# Patient Record
Sex: FEMALE | Race: BLACK OR AFRICAN AMERICAN
[De-identification: names, ages, dates, MRNs, and addresses within clinical notes are randomized per-mention and may not be internally consistent; named-entity substitution may affect disease eponyms.]

---

## 2018-01-26 ENCOUNTER — HOSPITAL ENCOUNTER (EMERGENCY)
Dept: HOSPITAL 62 - ER | Age: 83
Discharge: HOME | End: 2018-01-26
Payer: MEDICARE

## 2018-01-26 VITALS — DIASTOLIC BLOOD PRESSURE: 80 MMHG | SYSTOLIC BLOOD PRESSURE: 150 MMHG

## 2018-01-26 DIAGNOSIS — N30.00: Primary | ICD-10-CM

## 2018-01-26 DIAGNOSIS — Z98.51: ICD-10-CM

## 2018-01-26 DIAGNOSIS — I10: ICD-10-CM

## 2018-01-26 DIAGNOSIS — Z87.891: ICD-10-CM

## 2018-01-26 DIAGNOSIS — R68.83: ICD-10-CM

## 2018-01-26 LAB
ADD MANUAL DIFF: NO
ALBUMIN SERPL-MCNC: 3.9 G/DL (ref 3.5–5)
ALP SERPL-CCNC: 56 U/L (ref 38–126)
ALT SERPL-CCNC: 18 U/L (ref 9–52)
ANION GAP SERPL CALC-SCNC: 9 MMOL/L (ref 5–19)
APPEARANCE UR: (no result)
APTT PPP: YELLOW S
AST SERPL-CCNC: 37 U/L (ref 14–36)
BASOPHILS # BLD AUTO: 0 10^3/UL (ref 0–0.2)
BASOPHILS NFR BLD AUTO: 0.4 % (ref 0–2)
BILIRUB DIRECT SERPL-MCNC: 0.2 MG/DL (ref 0–0.4)
BILIRUB SERPL-MCNC: 0.6 MG/DL (ref 0.2–1.3)
BILIRUB UR QL STRIP: NEGATIVE
BUN SERPL-MCNC: 18 MG/DL (ref 7–20)
CALCIUM: 10.5 MG/DL (ref 8.4–10.2)
CHLORIDE SERPL-SCNC: 105 MMOL/L (ref 98–107)
CO2 SERPL-SCNC: 29 MMOL/L (ref 22–30)
EOSINOPHIL # BLD AUTO: 0 10^3/UL (ref 0–0.6)
EOSINOPHIL NFR BLD AUTO: 0.5 % (ref 0–6)
ERYTHROCYTE [DISTWIDTH] IN BLOOD BY AUTOMATED COUNT: 15.1 % (ref 11.5–14)
GLUCOSE SERPL-MCNC: 99 MG/DL (ref 75–110)
GLUCOSE UR STRIP-MCNC: NEGATIVE MG/DL
HCT VFR BLD CALC: 37.7 % (ref 36–47)
HGB BLD-MCNC: 11.8 G/DL (ref 12–15.5)
KETONES UR STRIP-MCNC: NEGATIVE MG/DL
LYMPHOCYTES # BLD AUTO: 1.7 10^3/UL (ref 0.5–4.7)
LYMPHOCYTES NFR BLD AUTO: 22.5 % (ref 13–45)
MCH RBC QN AUTO: 23 PG (ref 27–33.4)
MCHC RBC AUTO-ENTMCNC: 31.2 G/DL (ref 32–36)
MCV RBC AUTO: 74 FL (ref 80–97)
MONOCYTES # BLD AUTO: 0.7 10^3/UL (ref 0.1–1.4)
MONOCYTES NFR BLD AUTO: 9.3 % (ref 3–13)
NEUTROPHILS # BLD AUTO: 5 10^3/UL (ref 1.7–8.2)
NEUTS SEG NFR BLD AUTO: 67.3 % (ref 42–78)
NITRITE UR QL STRIP: POSITIVE
PH UR STRIP: 5 [PH] (ref 5–9)
PLATELET # BLD: 223 10^3/UL (ref 150–450)
POTASSIUM SERPL-SCNC: 3.8 MMOL/L (ref 3.6–5)
PROT SERPL-MCNC: 7.6 G/DL (ref 6.3–8.2)
PROT UR STRIP-MCNC: NEGATIVE MG/DL
RBC # BLD AUTO: 5.12 10^6/UL (ref 3.72–5.28)
SODIUM SERPL-SCNC: 142.6 MMOL/L (ref 137–145)
SP GR UR STRIP: 1.02
TOTAL CELLS COUNTED % (AUTO): 100 %
UROBILINOGEN UR-MCNC: 4 MG/DL (ref ?–2)
WBC # BLD AUTO: 7.5 10^3/UL (ref 4–10.5)

## 2018-01-26 PROCEDURE — 99283 EMERGENCY DEPT VISIT LOW MDM: CPT

## 2018-01-26 PROCEDURE — 80053 COMPREHEN METABOLIC PANEL: CPT

## 2018-01-26 PROCEDURE — 81001 URINALYSIS AUTO W/SCOPE: CPT

## 2018-01-26 PROCEDURE — 36415 COLL VENOUS BLD VENIPUNCTURE: CPT

## 2018-01-26 PROCEDURE — 85025 COMPLETE CBC W/AUTO DIFF WBC: CPT

## 2018-01-26 PROCEDURE — 87040 BLOOD CULTURE FOR BACTERIA: CPT

## 2018-01-26 PROCEDURE — 87086 URINE CULTURE/COLONY COUNT: CPT

## 2018-01-26 PROCEDURE — 87088 URINE BACTERIA CULTURE: CPT

## 2018-01-26 PROCEDURE — 87186 SC STD MICRODIL/AGAR DIL: CPT

## 2018-01-26 NOTE — ER DOCUMENT REPORT
ED Medical Screen (RME)





- General


Chief Complaint: Tremor


Stated Complaint: CHILLS, SHAKINESS


Time Seen by Provider: 18 17:16


Notes: 





This 82-year-old female patient brought to emergency room for onset last night 

of shaking and cold chills.  No other complaints.  At this time the patient 

states she feels fine.





I have greeted and performed a rapid initial assessment of this patient.  A 

comprehensive ED assessment and evaluation of the patient, analysis of test 

results and completion of the medical decision making process will be conducted 

by additional ED providers.


TRAVEL OUTSIDE OF THE [place] IN LAST 30 DAYS: No





- Related Data


Allergies/Adverse Reactions: 


 





No Known Allergies Allergy (Verified 18 16:57)


 











Past Medical History





- Past Medical History


Cardiac Medical History: Reports: Hx Hypertension - medicated


   Denies: Hx Atrial Fibrillation, Hx Congestive Heart Failure, Hx Coronary 

Artery Disease, Hx Heart Attack, Hx Hypercholesterolemia, Hx Peripheral 

Vascular Disease, Hx Heart Murmur


Pulmonary Medical History: 


   Denies: Hx Asthma, Hx Bronchitis, Hx COPD, Hx Pneumonia, Hx Tuberculosis


Neurological Medical History: Denies: Hx Cerebrovascular Accident, Hx Seizures


GI Medical History: Denies: Hx Hepatitis, Hx Hiatal Hernia, Hx Ulcer


Musculoskeltal Medical History: Denies Hx Multiple Sclerosis


Psychiatric Medical History: 


   Denies: Hx Dementia


Infectious Medical History: Denies: Hx Hepatitis


Past Surgical History: Reports: Hx Tubal Ligation.  Denies: Hx Appendectomy, Hx 

Bowel Surgery, Hx  Section, Hx Cholecystectomy, Hx Coronary Artery 

Bypass Graft, Hx Gastric Bypass Surgery, Hx Herniorrhaphy, Hx Hysterectomy, Hx 

Mastectomy, Hx Open Heart Surgery, Hx Pacemaker, Hx Tonsillectomy





Physical Exam





- Vital signs


Vitals: 





 











Temp Pulse BP Pulse Ox


 


 97.5 F   94   141/82 H  97 


 


 18 17:15  18 17:15  18 17:15  18 17:15














Course





- Vital Signs


Vital signs: 





 











Temp Pulse Resp BP Pulse Ox


 


 97.5 F   94      141/82 H  97 


 


 18 17:15  18 17:15     18 17:15  18 17:15

## 2018-01-26 NOTE — ER DOCUMENT REPORT
ED General





- General


Chief Complaint: Tremor


Stated Complaint: CHILLS, SHAKINESS


Time Seen by Provider: 18 17:16


Notes: 





Patient is an 82-year-old woman who presents with 48 hours of intermittent 

shaking and chills.  She denies any complaints at time of my assessment.  

Family states they have noticed her appearing very cold and shaking 

particularly at night which is what prompted them to bring her to the emergency 

department today.  No history of similar symptoms in the past.  Patient denies 

any localizing infectious symptoms including cough, headache, neck pain, 

shortness of breath, abdominal pain, vomiting or diarrhea.  She denies dysuria.

  She has not seen her primary doctor regarding today's concerns.  She has not 

tried to treat her symptoms and nothing seems to prompt the symptoms.


TRAVEL OUTSIDE OF THE [place] IN LAST 30 DAYS: No





- Related Data


Allergies/Adverse Reactions: 


 





No Known Allergies Allergy (Verified 18 16:57)


 











Past Medical History





- General


Information source: Patient





- Social History


Smoking Status: Former Smoker


Chew tobacco use (# tins/day): Yes


Frequency of alcohol use: None


Drug Abuse: None


Lives with: Family


Family History: Reviewed & Not Pertinent


Patient has suicidal ideation: No


Patient has homicidal ideation: No





- Past Medical History


Cardiac Medical History: Reports: Hx Hypertension - medicated


   Denies: Hx Atrial Fibrillation, Hx Congestive Heart Failure, Hx Coronary 

Artery Disease, Hx Heart Attack, Hx Hypercholesterolemia, Hx Peripheral 

Vascular Disease, Hx Heart Murmur


Pulmonary Medical History: 


   Denies: Hx Asthma, Hx Bronchitis, Hx COPD, Hx Pneumonia, Hx Tuberculosis


Neurological Medical History: Denies: Hx Cerebrovascular Accident, Hx Seizures


Renal/ Medical History: Denies: Hx Peritoneal Dialysis


GI Medical History: Denies: Hx Hepatitis, Hx Hiatal Hernia, Hx Ulcer


Musculoskeltal Medical History: Denies Hx Multiple Sclerosis


Psychiatric Medical History: 


   Denies: Hx Dementia


Infectious Medical History: Denies: Hx Hepatitis


Past Surgical History: Reports: Hx Tubal Ligation.  Denies: Hx Appendectomy, Hx 

Bowel Surgery, Hx  Section, Hx Cholecystectomy, Hx Coronary Artery 

Bypass Graft, Hx Gastric Bypass Surgery, Hx Herniorrhaphy, Hx Hysterectomy, Hx 

Mastectomy, Hx Open Heart Surgery, Hx Pacemaker, Hx Tonsillectomy





- Immunizations


Hx Pneumococcal Vaccination: 14





Review of Systems





- Review of Systems


Notes: 





Constitutional: Negative for fever.  Positive for chills


HENT: Negative for sore throat.


Eyes: Negative for visual changes.


Cardiovascular: Negative for chest pain.


Respiratory: Negative for shortness of breath.


Gastrointestinal: Negative for abdominal pain, vomiting or diarrhea.


Genitourinary: Negative for dysuria.


Musculoskeletal: Negative for back pain.


Skin: Negative for rash.


Neurological: Negative for headaches, weakness or numbness.





10 point ROS negative except as marked above and in HPI.





Physical Exam





- Vital signs


Vitals: 


 











Temp Pulse BP Pulse Ox


 


 97.5 F   94   141/82 H  97 


 


 18 17:15  18 17:15  18 17:15  18 17:15











Interpretation: Normal


Notes: 





PHYSICAL EXAMINATION:





GENERAL: Well-appearing, well-nourished and in no acute distress.





HEAD: Atraumatic, normocephalic.





EYES: Pupils equal round and reactive to light, extraocular movements intact, 

sclera anicteric, conjunctiva are normal.





ENT: nares patent, oropharynx clear without exudates.  Moist mucous membranes.





NECK: Normal range of motion, supple without lymphadenopathy





LUNGS: Breath sounds clear to auscultation bilaterally and equal.  No wheezes 

rales or rhonchi.





HEART: Regular rate and rhythm without murmurs





ABDOMEN: Soft, nontender, normoactive bowel sounds.  No guarding, no rebound.  

No masses appreciated.





EXTREMITIES: Normal range of motion, no pitting or edema.  No cyanosis.





NEUROLOGICAL: No focal neurological deficits. Moves all extremities 

spontaneously and on command.





PSYCH: Normal mood, normal affect.





SKIN: Warm, Dry, normal turgor, no rashes or lesions noted.





Course





- Re-evaluation


Re-evalutation: 





18 19:23


Patient presents with intermittent tremors and chills but denies any additional 

complaints.  She is otherwise very well in appearance, denies any complaints at 

the time of my assessment.  She denies any clinical history to suggest an acute 

pneumonia.  Lung sounds clear.  No abdominal pain or localized tenderness.  I 

do not suspect an acute appendicitis, aortic dissection, mesenteric ischemia, 

regional colitis, and I have not identified any areas of cellulitis.  She is 

laughing and joking with her family during the exam.  Her urinalysis is 

consistent with an acute urinary tract infection with 3+ bacteria, 15 white 

blood cells, positive leuk esterase and nitrates.  A urine culture has been 

sent and she will be started on cephalexin for coverage.  At this time will 

discharge with return precautions and follow-up recommendations.  Verbal 

discharge instructions given a the bedside and opportunity for questions given. 

Medication warnings reviewed. Patient is in agreement with this plan and has 

verbalized understanding of return precautions and the need for primary care 

follow-up in the next 24-72 hours.





- Vital Signs


Vital signs: 


 











Temp Pulse Resp BP Pulse Ox


 


 98.9 F   76   14   150/80 H  96 


 


 18 19:47  18 19:47  18 19:47  18 19:47  18 19:47














- Laboratory


Result Diagrams: 


 18 17:30





 18 17:30


Laboratory results interpreted by me: 


 











  18





  17:30 17:30 17:30


 


Hgb  11.8 L  


 


MCV  74 L  


 


MCH  23.0 L  


 


MCHC  31.2 L  


 


RDW  15.1 H  


 


Est GFR (Non-Af Amer)   56 L 


 


Calcium   10.5 H 


 


AST   37 H 


 


Urine Nitrite    POSITIVE H


 


Urine Urobilinogen    4.0 H


 


Ur Leukocyte Esterase    SMALL H














Discharge





- Discharge


Clinical Impression: 


 Chills





Urinary tract infection


Qualifiers:


 Urinary tract infection type: acute cystitis Hematuria presence: without 

hematuria Qualified Code(s): N30.00 - Acute cystitis without hematuria





Condition: Good


Disposition: HOME, SELF-CARE


Additional Instructions: 


Your urine shows findings consistent with a urinary tract infection.  Please 

take all the antibiotics as directed even if your symptoms have improved.  

Please follow-up with your primary care physician as needed.  Return to 

emergency room if you develop fever >101F, persistent vomiting, become lethargic

, have severe pain in your sides, or any other symptoms that are concerning to 

you.


Prescriptions: 


Cephalexin Monohydrate [Keflex 500 mg Capsule] 500 mg PO Q6H 5 Days  capsule


Referrals: 


KRISTEL GIBBONS MD [Primary Care Provider] - Follow up as needed

## 2019-02-09 ENCOUNTER — HOSPITAL ENCOUNTER (EMERGENCY)
Dept: HOSPITAL 62 - ER | Age: 84
Discharge: HOME | End: 2019-02-09
Payer: MEDICARE

## 2019-02-09 VITALS — SYSTOLIC BLOOD PRESSURE: 132 MMHG | DIASTOLIC BLOOD PRESSURE: 90 MMHG

## 2019-02-09 DIAGNOSIS — Z98.51: ICD-10-CM

## 2019-02-09 DIAGNOSIS — I10: ICD-10-CM

## 2019-02-09 DIAGNOSIS — F03.90: ICD-10-CM

## 2019-02-09 DIAGNOSIS — D72.820: ICD-10-CM

## 2019-02-09 DIAGNOSIS — R41.0: Primary | ICD-10-CM

## 2019-02-09 LAB
A TYPE INFLUENZA AG: NEGATIVE
ADD MANUAL DIFF: NO
ALBUMIN SERPL-MCNC: 4.5 G/DL (ref 3.5–5)
ALP SERPL-CCNC: 78 U/L (ref 38–126)
ALT SERPL-CCNC: 20 U/L (ref 9–52)
ANION GAP SERPL CALC-SCNC: 10 MMOL/L (ref 5–19)
APPEARANCE UR: CLEAR
APTT PPP: YELLOW S
AST SERPL-CCNC: 47 U/L (ref 14–36)
B INFLUENZA AG: NEGATIVE
BASOPHILS # BLD AUTO: 0 10^3/UL (ref 0–0.2)
BASOPHILS NFR BLD AUTO: 0.1 % (ref 0–2)
BILIRUB DIRECT SERPL-MCNC: 0.4 MG/DL (ref 0–0.4)
BILIRUB SERPL-MCNC: 0.8 MG/DL (ref 0.2–1.3)
BILIRUB UR QL STRIP: NEGATIVE
BUN SERPL-MCNC: 34 MG/DL (ref 7–20)
CALCIUM: 10.6 MG/DL (ref 8.4–10.2)
CHLORIDE SERPL-SCNC: 105 MMOL/L (ref 98–107)
CO2 SERPL-SCNC: 29 MMOL/L (ref 22–30)
EOSINOPHIL # BLD AUTO: 0 10^3/UL (ref 0–0.6)
EOSINOPHIL NFR BLD AUTO: 0 % (ref 0–6)
ERYTHROCYTE [DISTWIDTH] IN BLOOD BY AUTOMATED COUNT: 14.8 % (ref 11.5–14)
GLUCOSE SERPL-MCNC: 99 MG/DL (ref 75–110)
GLUCOSE UR STRIP-MCNC: NEGATIVE MG/DL
HCT VFR BLD CALC: 38.4 % (ref 36–47)
HGB BLD-MCNC: 12 G/DL (ref 12–15.5)
KETONES UR STRIP-MCNC: NEGATIVE MG/DL
LYMPHOCYTES # BLD AUTO: 1.3 10^3/UL (ref 0.5–4.7)
LYMPHOCYTES NFR BLD AUTO: 9.1 % (ref 13–45)
MCH RBC QN AUTO: 23.1 PG (ref 27–33.4)
MCHC RBC AUTO-ENTMCNC: 31.3 G/DL (ref 32–36)
MCV RBC AUTO: 74 FL (ref 80–97)
MONOCYTES # BLD AUTO: 0.9 10^3/UL (ref 0.1–1.4)
MONOCYTES NFR BLD AUTO: 6.8 % (ref 3–13)
NEUTROPHILS # BLD AUTO: 11.5 10^3/UL (ref 1.7–8.2)
NEUTS SEG NFR BLD AUTO: 84 % (ref 42–78)
NITRITE UR QL STRIP: NEGATIVE
PH UR STRIP: 5 [PH] (ref 5–9)
PLATELET # BLD: 212 10^3/UL (ref 150–450)
POTASSIUM SERPL-SCNC: 3.9 MMOL/L (ref 3.6–5)
PROT SERPL-MCNC: 8.7 G/DL (ref 6.3–8.2)
PROT UR STRIP-MCNC: NEGATIVE MG/DL
RBC # BLD AUTO: 5.21 10^6/UL (ref 3.72–5.28)
SODIUM SERPL-SCNC: 144.1 MMOL/L (ref 137–145)
SP GR UR STRIP: 1.02
TOTAL CELLS COUNTED % (AUTO): 100 %
UROBILINOGEN UR-MCNC: NEGATIVE MG/DL (ref ?–2)
WBC # BLD AUTO: 13.7 10^3/UL (ref 4–10.5)

## 2019-02-09 PROCEDURE — 87086 URINE CULTURE/COLONY COUNT: CPT

## 2019-02-09 PROCEDURE — 80053 COMPREHEN METABOLIC PANEL: CPT

## 2019-02-09 PROCEDURE — 99285 EMERGENCY DEPT VISIT HI MDM: CPT

## 2019-02-09 PROCEDURE — 87804 INFLUENZA ASSAY W/OPTIC: CPT

## 2019-02-09 PROCEDURE — 72125 CT NECK SPINE W/O DYE: CPT

## 2019-02-09 PROCEDURE — 71045 X-RAY EXAM CHEST 1 VIEW: CPT

## 2019-02-09 PROCEDURE — 87040 BLOOD CULTURE FOR BACTERIA: CPT

## 2019-02-09 PROCEDURE — 93010 ELECTROCARDIOGRAM REPORT: CPT

## 2019-02-09 PROCEDURE — 93005 ELECTROCARDIOGRAM TRACING: CPT

## 2019-02-09 PROCEDURE — 36415 COLL VENOUS BLD VENIPUNCTURE: CPT

## 2019-02-09 PROCEDURE — 84484 ASSAY OF TROPONIN QUANT: CPT

## 2019-02-09 PROCEDURE — 85025 COMPLETE CBC W/AUTO DIFF WBC: CPT

## 2019-02-09 PROCEDURE — 70450 CT HEAD/BRAIN W/O DYE: CPT

## 2019-02-09 PROCEDURE — 81001 URINALYSIS AUTO W/SCOPE: CPT

## 2019-02-09 NOTE — RADIOLOGY REPORT (SQ)
EXAM DESCRIPTION:  CHEST SINGLE VIEW



COMPLETED DATE/TIME:  2/9/2019 2:40 pm



REASON FOR STUDY:  AMS



COMPARISON:  1/9/2009.  2/3/2014.



EXAM PARAMETERS:  NUMBER OF VIEWS: One view.

TECHNIQUE: Single frontal radiographic view of the chest acquired.

RADIATION DOSE: NA

LIMITATIONS: None.



FINDINGS:  LUNGS AND PLEURA: Chronic lingular scarring .  No acute infiltrates or effusions.

MEDIASTINUM AND HILAR STRUCTURES: No masses.  Contour normal.

HEART AND VASCULAR STRUCTURES: The heart is normal with aortic atherosclerosis.  Pulmonary vasculatur
e is normal.

BONES: No acute findings.

HARDWARE: None in the chest.

OTHER: Persistent tracheal deviation to the left secondary to right thyroid enlargement.



IMPRESSION:  No acute disease.  See above.



TECHNICAL DOCUMENTATION:  JOB ID:  [id number]

[id number]

 2011 [email or web address]- All Rights Reserved



Reading location - IP/workstation name: [email or web address]

## 2019-02-09 NOTE — ER DOCUMENT REPORT
ED Medical Screen (RME)





- General


Chief Complaint: Urinary Problem


Stated Complaint: URINARY ISSUE


Time Seen by Provider: 19 14:14


Primary Care Provider: 


KRISTEL GIBBONS MD [Primary Care Provider] - Follow up as needed


Mode of Arrival: Wheelchair


Information source: Patient, Relative


Notes: 





83-year-old female brought to the emergency department by her daughter for 

altered mental status.  Daughter states that the patient has a history of 

dementia but is not at her baseline.  She states that she is more confused than 

normal today she has been rocking back and forth.  Daughter states that her 

symptoms are similar to when she has had a urinary tract infection previously.  

Daughter does note that she had 4 teeth pulled yesterday.  She is currently on 

clindamycin.  Daughter also notes that she found her on the ground yesterday.  

Unknown if the patient hit her head or loss consciousness.  Patient was 

complaining of neck pain yesterday.  Patient has no complaints in the room.  

Daughter denies any fever, vomiting, diarrhea, constipation.  States that she 

did have one episode of incontinence last night.





I have greeted and performed a rapid initial assessment of this patient.  A 

comprehensive ED assessment and evaluation of the patient, analysis of test 

results and completion of the medical decision making process will be conducted 

by additional ED providers.





PHYSICAL EXAMINATION:





GENERAL: Well-appearing, well-nourished and in no acute distress.





HEAD: No obvious trauma. 





EYES: Pupils equal round extraocular movements intact,  conjunctiva are normal.





ENT: Nares patent





NECK: Normal range of motion. No tenderness to palpation. 





LUNGS: No respiratory distress





Musculoskeletal: Normal range of motion





NEUROLOGICAL:  Normal speech.





TRAVEL OUTSIDE OF THE [place] IN LAST 30 DAYS: No





- Related Data


Allergies/Adverse Reactions: 


                                        





No Known Allergies Allergy (Verified 19 14:12)


   











Past Medical History





- Social History


Chew tobacco use (# tins/day): Yes


Frequency of alcohol use: None


Drug Abuse: None





- Past Medical History


Cardiac Medical History: Reports: Hx Hypertension - medicated


   Denies: Hx Atrial Fibrillation, Hx Congestive Heart Failure, Hx Coronary 

Artery Disease, Hx Heart Attack, Hx Hypercholesterolemia, Hx Peripheral Vascular

Disease, Hx Heart Murmur


Pulmonary Medical History: 


   Denies: Hx Asthma, Hx Bronchitis, Hx COPD, Hx Pneumonia, Hx Tuberculosis


Neurological Medical History: Denies: Hx Cerebrovascular Accident, Hx Seizures


Renal/ Medical History: Denies: Hx Peritoneal Dialysis


GI Medical History: Denies: Hx Hepatitis, Hx Hiatal Hernia, Hx Ulcer


Musculoskeltal Medical History: Denies Hx Multiple Sclerosis


Psychiatric Medical History: 


   Denies: Hx Dementia


Infectious Medical History: Denies: Hx Hepatitis


Past Surgical History: Reports: Hx Tubal Ligation.  Denies: Hx Appendectomy, Hx 

Bowel Surgery, Hx  Section, Hx Cholecystectomy, Hx Coronary Artery 

Bypass Graft, Hx Gastric Bypass Surgery, Hx Herniorrhaphy, Hx Hysterectomy, Hx 

Mastectomy, Hx Open Heart Surgery, Hx Pacemaker, Hx Tonsillectomy





Physical Exam





- Vital signs


Vitals: 





                                        











Temp Pulse Resp BP Pulse Ox


 


 97.7 F   77   20   126/80 H  99 


 


 19 13:41  19 13:41  19 13:41  19 13:41  19 13:41














Course





- Vital Signs


Vital signs: 





                                        











Temp Pulse Resp BP Pulse Ox


 


 97.7 F   77   20   126/80 H  99 


 


 19 13:41  19 13:41  19 13:41  19 13:41  19 13:41














Doctor's Discharge





- Discharge


Referrals: 


KRISTEL GIBBONS MD [Primary Care Provider] - Follow up as needed

## 2019-02-09 NOTE — RADIOLOGY REPORT (SQ)
EXAM DESCRIPTION:  CT CERVICAL SPINE WITHOUT



COMPLETED DATE/TIME:  2/9/2019 2:48 pm



REASON FOR STUDY:  fall



COMPARISON:  None.



TECHNIQUE:  Axial images acquired through the cervical spine without intravenous contrast.  Images re
viewed with lung, soft tissue and bone windows.  Reconstructed coronal and sagittal MPR images review
ed.  Images stored on PACS.

All CT scanners at this facility use dose modulation, iterative reconstruction, and/or weight based d
osing when appropriate to reduce radiation dose to as low as reasonably achievable (ALARA).

[facility]: Dose Right  [facility]: CareDose    [facility]: Dose Right    [facility]: Teradose 4D    [facility]: Smart One Diary



RADIATION DOSE:  CT Rad equipment meets quality standard of care and radiation dose reduction techniq
ues were employed. CTDIvol: 24.1 mGy. DLP: 514 mGy-cm. mGy.



LIMITATIONS:  None.



FINDINGS:  ALIGNMENT: Scoliosis convex right

MINERALIZATION: Normal.

VERTEBRAL BODIES: Cervical spondylosis and degenerative disc disease prominent C4-7.  DISCS:

C1-C2: No abnormality.  .

C2-C3: No abnormality.

C3-C4: No abnormality.

C4-C5:  Degenerated circumferential bulging disc.  Mild foraminal narrowing noted bilaterally.

C5-C6: Degenerated circumferential bulging disc.  Foraminal stenosis prominent on the left.  Facet ar
thropathy.

C6-C7: Cervical spondylosis and degenerative disc disease.

C7-T1: No significant spinal stenosis or exit foraminal stenosis.

FACETS, LATERAL MASSES, POSTERIOR ELEMENTS: No fractures.  No dislocation.  No acute findings.

VISUALIZED RIBS: No fractures.

LUNG APICES AND SOFT TISSUES: No abnormality seen.

OTHER: The right thyroid gland is markedly enlarged and heterogeneous in density.  The right thyroid 
gland measures 8.4 cm(H)x 4.6 cm(T)X 5.9 cm (AP) diameter.  Left thyroid gland is small in size and h
eterogeneous measuring 2.7 cm (H) x1.6 cm x(T)x 1.9 cm (AP) diameter.  Substernal extension of right 
thyroid gland noted.  Associated tracheal displacement to the left is noted.



IMPRESSION:  No acute fracture identified.  Multilevel cervical spondylosis with degenerative disc di
sease.  Marked right thyromegaly.  Substernal extension of right thyroid gland and associated trachea
l deviation to the left.



TECHNICAL DOCUMENTATION:  JOB ID:  [id number]

[id number]

Quality ID # 436: Final reports with documentation of one or more dose reduction techniques (e.g., Au
tomated exposure control, adjustment of the mA and/or kV according to patient size, use of iterative 
reconstruction technique)

 2011 [employer]- All Rights Reserved



Reading location - IP/workstation name: [email or web address]

## 2019-02-09 NOTE — EKG REPORT
SEVERITY:- ABNORMAL ECG -

SINUS RHYTHM

LVH WITH SECONDARY REPOLARIZATION ABNORMALITY

:

Confirmed by: Lopez Melgar 09-Feb-2019 20:04:36

## 2019-02-09 NOTE — RADIOLOGY REPORT (SQ)
EXAM DESCRIPTION:  CT HEAD WITHOUT



COMPLETED DATE/TIME:  2/9/2019 2:48 pm



REASON FOR STUDY:  fall, AMS



COMPARISON:  None.



TECHNIQUE:  Axial images acquired through the brain without intravenous contrast.  Images reviewed wi
th bone, brain and subdural windows.  Additional sagittal and coronal reconstructions were generated.
 Images stored on PACS.

All CT scanners at this facility use dose modulation, iterative reconstruction, and/or weight based d
osing when appropriate to reduce radiation dose to as low as reasonably achievable (ALARA).

[facility]: Dose Right  [facility]: CareDose    [facility]: Dose Right    [facility]: Teradose 4D    [facility]: Smart Mustbin



RADIATION DOSE:  CT Rad equipment meets quality standard of care and radiation dose reduction techniq
ues were employed. CTDIvol: 53.2 mGy. DLP: 1097 mGy-cm.mGy.



LIMITATIONS:  None.



FINDINGS:  VENTRICLES: Generalized ventriculomegaly likely related to chronic atrophy.

CEREBRUM: No masses.  No hemorrhage.  No midline shift.  Moderate patchy areas of low density in the 
white matter most likely due to chronic micro-vascular ischemic change.  No evidence for acute infarc
tion.

CEREBELLUM: No masses.  No hemorrhage.  No alteration of density.  No evidence for acute infarction.

EXTRAAXIAL SPACES: No hemorrhage or mass or shift.

ORBITS AND GLOBE: No intra- or extraconal masses.  Normal contour of globe without masses.

CALVARIUM: No fracture.

PARANASAL SINUSES: No fluid or mucosal thickening.

SOFT TISSUES: No mass or hematoma.

OTHER: No other significant finding.



IMPRESSION:  CHRONIC CHANGES OF ATROPHY AND MICROVASCULAR ISCHEMIA.  NO ACUTE PROCESS.

EVIDENCE OF ACUTE STROKE: NO.



TECHNICAL DOCUMENTATION:  JOB ID:  [id number]

Quality ID # 436: Final reports with documentation of one or more dose reduction techniques (e.g., Au
tomated exposure control, adjustment of the mA and/or kV according to patient size, use of iterative 
reconstruction technique)

 2011 [email or web address]- All Rights Reserved



Reading location - IP/workstation name: [email or web address]

## 2019-02-27 NOTE — ER DOCUMENT REPORT
Entered by LUZ MARINA ROCHE SCRIBE  02/09/19 [id number] 





Acting as scribe for:SHANIQUE UMAÑA DO





ED General





- General


Chief Complaint: Urinary Problem


Stated Complaint: URINARY ISSUE


Time Seen by Provider: 02/09/19 14:14


Primary Care Provider: 


KRISTEL GIBBONS MD [Primary Care Provider] - Follow up as needed


Mode of Arrival: Wheelchair


Information source: Patient, Relative


Notes: 


Patient is an 83 year old female with dementia presents to the emergency 

department accompanied by daughter complaining of altered mental status. 

Daughter states the patient has been more confused lately as well as rocking 

back and forth which is out of character. She also states she found the patient 

this morning sitting on the floor. She is unsure if the patient fell or had a 

loss of consciousness. Daughter states she believes the patient has an UTI due 

to having similar symptoms with a previous UTI. She also reports an episode of 

urine incontinence. Daughter denies fevers, vomiting, diarrhea or constipation. 

Patient denies any pain or focal symptoms. 





TRAVEL OUTSIDE OF THE [place] IN LAST 30 DAYS: No





- Related Data


Allergies/Adverse Reactions: 


                                        





No Known Allergies Allergy (Verified 02/09/19 14:12)


   











Past Medical History





- General


Information source: Patient, Relative





- Social History


Smoking Status: Never Smoker


Chew tobacco use (# tins/day): Yes


Frequency of alcohol use: None


Drug Abuse: None


Family History: Reviewed & Not Pertinent


Patient has suicidal ideation: No


Patient has homicidal ideation: No





- Past Medical History


Cardiac Medical History: Reports: Hx Hypertension - medicated


Psychiatric Medical History: Reports: Hx Dementia


Past Surgical History: Reports: Hx Tubal Ligation





- Immunizations


Hx Pneumococcal Vaccination: 02/13/14





Review of Systems





- Review of Systems


Constitutional: No symptoms reported


EENT: No symptoms reported


Cardiovascular: No symptoms reported


Respiratory: No symptoms reported


Gastrointestinal: No symptoms reported


Genitourinary: No symptoms reported


Female Genitourinary: No symptoms reported


Musculoskeletal: No symptoms reported


Skin: No symptoms reported


Hematologic/Lymphatic: No symptoms reported


Neurological/Psychological: See HPI, Confusion, Dementia


-: Yes All other systems reviewed and negative





Physical Exam





- Vital signs


Vitals: 


                                        











Temp Pulse Resp BP Pulse Ox


 


 97.7 F   77   20   126/80 H  99 


 


 02/09/19 13:41  02/09/19 13:41  02/09/19 13:41  02/09/19 13:41  02/09/19 13:41














- Notes


Notes: 


GENERAL: Alert, interacts well. No acute distress.


HEAD: Normocephalic, atraumatic.


EYES: Pupils equal, round, and reactive to light. Extraocular movements intact.


ENT: Oral mucosa moist, tongue midline. 


NECK: Full range of motion. Supple. Trachea midline.


LUNGS: Clear to auscultation bilaterally, no wheezes, rales, or rhonchi. No 

respiratory distress.


HEART: Regular rate and rhythm.  1/6 systolic murmur.  No gallops or rubs.


ABDOMEN: Soft, non-tender. Non-distended. Bowel sounds present in all 4 

quadrants.


EXTREMITIES: Moves all 4 extremities spontaneously. 


NEUROLOGICAL: Alert and oriented to person and place. Not oriented to time. 

Normal speech. 


PSYCH: Normal affect, normal mood.


SKIN: Warm, dry, normal turgor. No rashes or lesions noted.











Course





- Re-evaluation


Re-evalutation: 





02/09/19 19:31


CBC shows leukocytosis of 13.7, no anemia, platelets normal, CMP shows slightly 

elevated BUN otherwise unremarkable, cardiac enzymes negative x2, urinalysis 

unremarkable, flu a and B swabs are negative, CT scan of the head and neck were 

ordered given the fact that she was found on her floor and we do not know how 

she ended up there.  These were negative for fracture dislocation.  Chest x-ray 

was unremarkable as well.  Only findings were tracheal deviation from stable 

thyromegaly.  Daughter does mention that the patient is currently on antibiotics

for dental infection, it is possible that the dental infection is causing her 

slightly increased confusion and the rocking back and forth.  Patient is not 

having symptoms severe enough to warrant admission at this time.  Patient will 

continue taking the oral antibiotics for her dental infection and be discharged 

to home.  Blood cultures are pending as is urine culture.  Patient will return 

to the emergency department for worsening confusion, fevers or any new or con

cerning symptoms.





- Vital Signs


Vital signs: 


                                        











Temp Pulse Resp BP Pulse Ox


 


 97.7 F   70   17   132/90 H  98 


 


 02/09/19 20:01  02/09/19 20:01  02/09/19 20:01  02/09/19 20:01  02/09/19 20:01














- Laboratory


Result Diagrams: 


                                 02/09/19 15:08





                                 02/09/19 15:08


Laboratory results interpreted by me: 


                                        











  02/09/19 02/09/19 02/09/19





  15:08 15:08 15:55


 


WBC  13.7 H  


 


MCV  74 L  


 


MCH  23.1 L  


 


MCHC  31.3 L  


 


RDW  14.8 H  


 


Seg Neutrophils %  84.0 H  


 


Lymphocytes %  9.1 L  


 


Absolute Neutrophils  11.5 H  


 


BUN   34 H 


 


Est GFR (Non-Af Amer)   50 L 


 


Calcium   10.6 H 


 


AST   47 H 


 


Total Protein   8.7 H 


 


Urine Ascorbic Acid    40 H














- EKG Interpretation by Me


Additional EKG results interpreted by me: 





02/09/19 19:33


EKG shows sinus rhythm at a rate of 71, normal axis, normal intervals, no ST 

segment elevations or depressions, there are T wave inversions noted in 1 and 

aVL as well as in V4 through the 6 which are new since prior EKG on 2/3/2014, 

there is LVH per my interpretation.





Discharge





- Discharge


Clinical Impression: 


Altered mental status


Qualifiers:


 Altered mental status type: disorientation Qualified Code(s): R41.0 - 

Disorientation, unspecified





Leukocytosis


Qualifiers:


 Leukocytosis type: lymphocytosis Qualified Code(s): D72.820 - Lymphocytosis 

(symptomatic)





Condition: Stable


Disposition: HOME, SELF-CARE


Additional Instructions: 


Please continue taking the antibiotics that you are prescribed by your dentist. 

Please return to the emergency department for fevers or worsening confusion or 

any new or concerning symptoms.


Referrals: 


KRISTEL GIBBONS MD [Primary Care Provider] - Follow up as needed





I personally performed the services described in the documentation, reviewed and

edited the documentation which was dictated to the scribe in my presence, and it

accurately records my words and actions.

## 2019-03-08 ENCOUNTER — HOSPITAL ENCOUNTER (INPATIENT)
Dept: HOSPITAL 62 - 5 | Age: 84
LOS: 4 days | Discharge: HOME | DRG: 310 | End: 2019-03-12
Attending: INTERNAL MEDICINE | Admitting: INTERNAL MEDICINE
Payer: MEDICARE

## 2019-03-08 DIAGNOSIS — K21.9: ICD-10-CM

## 2019-03-08 DIAGNOSIS — E55.9: ICD-10-CM

## 2019-03-08 DIAGNOSIS — M15.3: ICD-10-CM

## 2019-03-08 DIAGNOSIS — L30.9: ICD-10-CM

## 2019-03-08 DIAGNOSIS — Z79.899: ICD-10-CM

## 2019-03-08 DIAGNOSIS — F03.90: ICD-10-CM

## 2019-03-08 DIAGNOSIS — Z23: ICD-10-CM

## 2019-03-08 DIAGNOSIS — Z86.010: ICD-10-CM

## 2019-03-08 DIAGNOSIS — I48.91: Primary | ICD-10-CM

## 2019-03-08 DIAGNOSIS — E05.90: ICD-10-CM

## 2019-03-08 DIAGNOSIS — Z88.8: ICD-10-CM

## 2019-03-08 DIAGNOSIS — M81.0: ICD-10-CM

## 2019-03-08 DIAGNOSIS — I10: ICD-10-CM

## 2019-03-08 LAB
ADD MANUAL DIFF: NO
ALBUMIN SERPL-MCNC: 3.7 G/DL (ref 3.5–5)
ALP SERPL-CCNC: 68 U/L (ref 38–126)
ALT SERPL-CCNC: 26 U/L (ref 9–52)
ANION GAP SERPL CALC-SCNC: 8 MMOL/L (ref 5–19)
AST SERPL-CCNC: 48 U/L (ref 14–36)
BASOPHILS # BLD AUTO: 0 10^3/UL (ref 0–0.2)
BASOPHILS NFR BLD AUTO: 0.6 % (ref 0–2)
BILIRUB DIRECT SERPL-MCNC: 0.3 MG/DL (ref 0–0.4)
BILIRUB SERPL-MCNC: 0.7 MG/DL (ref 0.2–1.3)
BUN SERPL-MCNC: 33 MG/DL (ref 7–20)
CALCIUM: 10.4 MG/DL (ref 8.4–10.2)
CHLORIDE SERPL-SCNC: 104 MMOL/L (ref 98–107)
CO2 SERPL-SCNC: 29 MMOL/L (ref 22–30)
EOSINOPHIL # BLD AUTO: 0.1 10^3/UL (ref 0–0.6)
EOSINOPHIL NFR BLD AUTO: 1.4 % (ref 0–6)
ERYTHROCYTE [DISTWIDTH] IN BLOOD BY AUTOMATED COUNT: 14.3 % (ref 11.5–14)
FREE T4 (FREE THYROXINE): 1.99 NG/DL (ref 0.78–2.19)
GLUCOSE SERPL-MCNC: 109 MG/DL (ref 75–110)
HCT VFR BLD CALC: 34.7 % (ref 36–47)
HGB BLD-MCNC: 11 G/DL (ref 12–15.5)
LYMPHOCYTES # BLD AUTO: 1.1 10^3/UL (ref 0.5–4.7)
LYMPHOCYTES NFR BLD AUTO: 22.1 % (ref 13–45)
MCH RBC QN AUTO: 23.2 PG (ref 27–33.4)
MCHC RBC AUTO-ENTMCNC: 31.7 G/DL (ref 32–36)
MCV RBC AUTO: 73 FL (ref 80–97)
MONOCYTES # BLD AUTO: 0.6 10^3/UL (ref 0.1–1.4)
MONOCYTES NFR BLD AUTO: 13.1 % (ref 3–13)
NEUTROPHILS # BLD AUTO: 3 10^3/UL (ref 1.7–8.2)
NEUTS SEG NFR BLD AUTO: 62.8 % (ref 42–78)
PLATELET # BLD: 168 10^3/UL (ref 150–450)
POTASSIUM SERPL-SCNC: 3.5 MMOL/L (ref 3.6–5)
PROT SERPL-MCNC: 7.2 G/DL (ref 6.3–8.2)
RBC # BLD AUTO: 4.73 10^6/UL (ref 3.72–5.28)
SODIUM SERPL-SCNC: 140.8 MMOL/L (ref 137–145)
TOTAL CELLS COUNTED % (AUTO): 100 %
TSH SERPL-ACNC: < 0.01 UIU/ML (ref 0.47–4.68)
WBC # BLD AUTO: 4.8 10^3/UL (ref 4–10.5)

## 2019-03-08 PROCEDURE — 93010 ELECTROCARDIOGRAM REPORT: CPT

## 2019-03-08 PROCEDURE — 90471 IMMUNIZATION ADMIN: CPT

## 2019-03-08 PROCEDURE — 90686 IIV4 VACC NO PRSV 0.5 ML IM: CPT

## 2019-03-08 PROCEDURE — 80053 COMPREHEN METABOLIC PANEL: CPT

## 2019-03-08 PROCEDURE — 84443 ASSAY THYROID STIM HORMONE: CPT

## 2019-03-08 PROCEDURE — 71046 X-RAY EXAM CHEST 2 VIEWS: CPT

## 2019-03-08 PROCEDURE — G0008 ADMIN INFLUENZA VIRUS VAC: HCPCS

## 2019-03-08 PROCEDURE — 84439 ASSAY OF FREE THYROXINE: CPT

## 2019-03-08 PROCEDURE — 80048 BASIC METABOLIC PNL TOTAL CA: CPT

## 2019-03-08 PROCEDURE — 36415 COLL VENOUS BLD VENIPUNCTURE: CPT

## 2019-03-08 PROCEDURE — 85025 COMPLETE CBC W/AUTO DIFF WBC: CPT

## 2019-03-08 PROCEDURE — 93005 ELECTROCARDIOGRAM TRACING: CPT

## 2019-03-08 RX ADMIN — DILTIAZEM HYDROCHLORIDE SCH MG: 30 TABLET, FILM COATED ORAL at 23:11

## 2019-03-08 RX ADMIN — CALCIUM SCH MG: 500 TABLET ORAL at 21:16

## 2019-03-08 RX ADMIN — DILTIAZEM HYDROCHLORIDE SCH MG: 30 TABLET, FILM COATED ORAL at 15:03

## 2019-03-08 RX ADMIN — APIXABAN SCH MG: 2.5 TABLET, FILM COATED ORAL at 17:20

## 2019-03-08 NOTE — PDOC H&P
History of Present Illness


Admission Date/PCP: 


  19 13:28





  [facility]





Patient complains of: Leg swelling


History of Present Illness: 


BRUNA CORBETT is a 83 year old female known to my practice who presented to 

the office with daughter complaining about intermittent leg swelling over 

preceding 1 week. She denied any associated chest pain, difficulty with 

breathing, palpitation, irregular heart beat or excessive salt intake. She 

claimed compliance with her medication administration but did not take 

medication so far today. Daughter reported that leg swelling is more in the left

leg. No recent long distance traveling, instrumentation or trauma. Her 

evaluation in the office revealed intermittent irregularly irregular heart 

rhythm with systolic murmur but no significant leg swelling. Her 12 lead EKG 

revealed atrial fibrillation with rapid ventricular rate. She was subsequently 

advised hospitalization for further evaluation and management. She adamantly 

continue to snuff tobacco product. Her morbidities include hypertension, 

hyperthyroidism, GERD, vitamin D deficiency, osteoporosis, osteoarthritis, 

history of polyp of colon and Eczema.








Past Medical History


Cardiac Medical History: Reports: Hypertension - medicated


   Denies: Atrial Fibrillation, Congestive Heart Failure, Coronary Artery 

Disease, Myocardial Infarction, Hyperlipidema, Peripheral Vascular Disease, 

Heart Murmur


Pulmonary Medical History: 


   Denies: Asthma, Bronchitis, Chronic Obstructive Pulmonary Disease (COPD), 

Pneumonia, Tuberculosis


Neurological Medical History: 


   Denies: Seizures


GI Medical History: 


   Denies: Hepatitis, Hiatal Hernia


Psychiatric Medical History: Reports: Dementia


Hematology: 


   Denies: Anemia, Sickle Cell Disease





Past Surgical History


Past Surgical History: Reports: Tubal Ligation


   Denies: Amputation, Appendectomy,  Section, Cholecystectomy, Coronary

Artery Bypass Graft, Gastric Bypass Surgery, Herniorrhaphy, Hysterectomy, 

Mastectomy, Pacemaker, Tonsillectomy





Social History


Smoking Status: Never Smoker


Frequency of Alcohol Use: None


Hx Recreational Drug Use: No


Drugs: None


Hx Prescription Drug Abuse: No





Family History


Family History: Reviewed & Not Pertinent


Parental Family History Reviewed: Yes


Children Family History Reviewed: Yes


Sibling(s) Family History Reviewed.: Yes





Medication/Allergy


Home Medications: 








Omeprazole 20 mg PO DAILY 14 


Amlodipine Besylate [Norvasc 10 mg Tablet] 10 mg PO DAILY 19 


Calcium Carbonate [Calcium] 600 mg PO DAILY 19 


Cholecalciferol (Vitamin D3) [Vitamin D3 1000 Unit Tablet] 1,000 unit PO DAILY 

19 


Clotrimazole/Betamethasone Dip [Lotrisone Cream] 1 applic TP BID 19 


Irbesartan/Hydrochlorothiazide [Irbesartan-Hctz 300-12.5 mg Tb] 1 each PO DAILY 

19 


Metoprolol Succinate [Toprol Xl 50 mg Tab.sr] 50 mg PO DAILY 19 


Multivitamin [Tab-A-Jese (Multiple Vitamin) Tablet] 1 tab PO DAILY 19 


Nystatin [Mycostatin Ointment] 1 applic TP BID 19 








Allergies/Adverse Reactions: 


                                        





lisinopril Adverse Reaction (Mild, Verified 19 17:36)


   cough











Review of Systems


Constitutional: ABSENT: chills, fever(s), headache(s), weight gain, weight loss


Eyes: ABSENT: visual disturbances


Ears: ABSENT: hearing changes


Nose, Mouth, and Throat: ABSENT: as per HPI, headache(s), mouth pain, sore 

throat, vertigo, other


Cardiovascular: PRESENT: edema - intermittent over preceding 1 week.  ABSENT: 

chest pain, dyspnea on exertion, orthropnea, palpitations


Respiratory: ABSENT: cough, hemoptysis


Gastrointestinal: ABSENT: abdominal pain, constipation, diarrhea, hematemesis, 

hematochezia, nausea, vomiting


Genitourinary: ABSENT: dysuria, hematuria


Musculoskeletal: ABSENT: joint swelling


Neurological: ABSENT: abnormal gait, abnormal speech, confusion, dizziness, foc

al weakness, syncope


Endocrine: ABSENT: cold intolerance, heat intolerance, polydipsia, polyuria


Hematologic/Lymphatic: ABSENT: easy bleeding, easy bruising, lymphadenopathy


Allergic/Immunologic: ABSENT: seasonal rhinorrhea





Physical Exam


Vital Signs: 


                                        











Temp Pulse Resp BP Pulse Ox


 


 98.1 F   122 H  20   108/82   98 


 


 19 14:33  19 14:33  19 14:33  19 14:33  19 14:33








                                 Intake & Output











 19





 06:59 06:59 06:59


 


Intake Total   236


 


Balance   236


 


Weight   58.7 kg











General appearance: PRESENT: no acute distress


Head exam: PRESENT: atraumatic, normocephalic


Eye exam: PRESENT: conjunctiva pink, EOMI, PERRLA.  ABSENT: scleral icterus


Ear exam: PRESENT: normal external ear exam


Mouth exam: PRESENT: moist


Neck exam: PRESENT: full ROM.  ABSENT: carotid bruit, JVD, lymphadenopathy, 

thyromegaly


Respiratory exam: PRESENT: clear to auscultation giacomo


Cardiovascular exam: PRESENT: irregular rhythm, +S1, +S2, systolic murmur, 

tachycardia.  ABSENT: diastolic murmur


Vascular exam: PRESENT: normal capillary refill.  ABSENT: pallor


GI/Abdominal exam: PRESENT: normal bowel sounds, soft.  ABSENT: distended, 

guarding, mass, organolmegaly, rebound, tenderness


Rectal exam: PRESENT: deferred


Extremities exam: ABSENT: pedal edema


Musculoskeletal exam: PRESENT: deformity - multiple joints involvement with 

arthritis


Neurological exam: PRESENT: alert, awake, oriented to person, oriented to place,

oriented to time, oriented to situation, CN II-XII grossly intact.  ABSENT: 

motor sensory deficit


Psychiatric exam: PRESENT: appropriate affect, normal mood.  ABSENT: homicidal 

ideation, suicidal ideation


Skin exam: PRESENT: dry, rash - scaly irregular border rash over left side of 

neck, warm





Results


Laboratory Results: 


                                        





                                 19 14:30 





                                 19 14:30 





                                        











  19





  14:30 14:30 14:30


 


WBC  4.8  


 


RBC  4.73  


 


Hgb  11.0 L  


 


Hct  34.7 L  


 


MCV  73 L  


 


MCH  23.2 L  


 


MCHC  31.7 L  


 


RDW  14.3 H  


 


Plt Count  168  


 


Seg Neutrophils %  62.8  


 


Lymphocytes %  22.1  


 


Monocytes %  13.1 H  


 


Eosinophils %  1.4  


 


Basophils %  0.6  


 


Absolute Neutrophils  3.0  


 


Absolute Lymphocytes  1.1  


 


Absolute Monocytes  0.6  


 


Absolute Eosinophils  0.1  


 


Absolute Basophils  0.0  


 


Sodium   140.8 


 


Potassium   3.5 L 


 


Chloride   104 


 


Carbon Dioxide   29 


 


Anion Gap   8 


 


BUN   33 H 


 


Creatinine   1.02 


 


Est GFR ( Amer)   > 60 


 


Est GFR (Non-Af Amer)   52 L 


 


Glucose   109 


 


Calcium   10.4 H 


 


Total Bilirubin   0.7 


 


AST   48 H 


 


ALT   26 


 


Alkaline Phosphatase   68 


 


Total Protein   7.2 


 


Albumin   3.7 


 


TSH    < 0.01 L


 


Free T4    1.99











Impressions: 


                                        





Chest X-Ray  19 00:00


IMPRESSION:  NO ACUTE RADIOGRAPHIC FINDING IN THE CHEST.


 














Assessment & Plan





- Diagnosis


(1) Atrial fibrillation with rapid ventricular response


Is this a current diagnosis for this admission?: Yes   


Plan: 


Admit for rate control management and anticoagulation. See admitting physician 

orders for details.








(2) HTN (hypertension)


Qualifiers: 


   Hypertension type: essential hypertension   Qualified Code(s): I10 - 

Essential (primary) hypertension   


Is this a current diagnosis for this admission?: Yes   


Plan: 


Hold preadmission anti hypertensive medication to allow for rate control 

medication usage due to her low normal blood pressure readings presently.








(3) Hyperthyroidism, subclinical


Is this a current diagnosis for this admission?: Yes   


Plan: 


Continue to monitor response to current medication management








(4) GERD (gastroesophageal reflux disease)


Qualifiers: 


   Esophagitis presence: without esophagitis   Qualified Code(s): K21.9 - 

Gastro-esophageal reflux disease without esophagitis   


Is this a current diagnosis for this admission?: Yes   


Plan: 


Maintain on PPI medication and lifestyle management.








(5) Postmenopausal osteoporosis


Is this a current diagnosis for this admission?: Yes   


Plan: 


Continue preadmission medication management as indicated.








(6) Vitamin D deficiency


Is this a current diagnosis for this admission?: Yes   


Plan: 


Continue current preadmission medication management as indicated.








(7) Osteoarthritis involving multiple joints on both sides of body


Is this a current diagnosis for this admission?: Yes   


Plan: 


Continue current preadmission medication management as indicated.








- Time


Time Spent: 50 to 70 Minutes


Medications reviewed and adjusted accordingly: Yes


Anticipated discharge: Home


Within: Other





- Inpatient Certification


Based on my medical assessment, after consideration of the patient's 

comorbidities, presenting symptoms, or acuity I expect that the services needed 

warrant INPATIENT care.: Yes


I certify that my determination is in accordance with my understanding of 

Medicare's requirements for reasonable and necessary INPATIENT services [42 CFR 

412.3e].: Yes


Medical Necessity: Significant Comorbidiites Make Outpatient Treatment Too 

Risky, Need Close Monitoring Due to Risk of Patient Decompensation, Need For 

Continuous Telemetry Monitoring, Risk of Complication if Not Cared For in 

Hospital, Risk of Diagnosis Which Will Require Inpatient Eval/Care/Monitoring


Post Hospital Care: D/C Planner Documentation





- Plan Summary


Plan Summary: 





See admitting attending physician orders as per above outlined care plan.

## 2019-03-08 NOTE — EKG REPORT
SEVERITY:- ABNORMAL ECG -

ATRIAL FIBRILLATION, V-RATE 

LVH WITH SECONDARY REPOLARIZATION ABNORMALITY

BORDERLINE PROLONGED QT INTERVAL

:

Confirmed by: Kayla Atkins MD 08-Mar-2019 20:50:59

## 2019-03-08 NOTE — RADIOLOGY REPORT (SQ)
EXAM DESCRIPTION:  CHEST 2 VIEWS



COMPLETED DATE/TIME:  3/8/2019 3:25 pm



REASON FOR STUDY:  AFIB WITH RVR



COMPARISON:  Chest film 2/9/2019

CT chest 5/29/2014



EXAM PARAMETERS:  NUMBER OF VIEWS: two views

TECHNIQUE: Digital Frontal and Lateral radiographic views of the chest acquired.

RADIATION DOSE: NA

LIMITATIONS: none



FINDINGS:  LUNGS AND PLEURA: No opacities, masses or pneumothorax. No pleural effusion.

MEDIASTINUM AND HILAR STRUCTURES: Fullness in the right peritracheal region along the thoracic inlet 
from right-sided goiter, unchanged from CT chest 2014

HEART AND VASCULAR STRUCTURES: Heart normal size.  No evidence for failure.

BONES: No acute findings.

HARDWARE: None in the chest.

OTHER: No other significant finding.



IMPRESSION:  NO ACUTE RADIOGRAPHIC FINDING IN THE CHEST.



TECHNICAL DOCUMENTATION:  JOB ID:  [id number]

 2011 [email or web address]- All Rights Reserved



Reading location - IP/workstation name: [email or web address]

## 2019-03-09 RX ADMIN — CALCIUM SCH MG: 500 TABLET ORAL at 11:15

## 2019-03-09 RX ADMIN — LANSOPRAZOLE SCH MG: 30 TABLET, ORALLY DISINTEGRATING, DELAYED RELEASE ORAL at 05:13

## 2019-03-09 RX ADMIN — DILTIAZEM HYDROCHLORIDE SCH MG: 30 TABLET, FILM COATED ORAL at 11:15

## 2019-03-09 RX ADMIN — DILTIAZEM HYDROCHLORIDE SCH MG: 30 TABLET, FILM COATED ORAL at 18:57

## 2019-03-09 RX ADMIN — APIXABAN SCH MG: 2.5 TABLET, FILM COATED ORAL at 11:15

## 2019-03-09 RX ADMIN — MULTIVITAMIN TABLET SCH TAB: TABLET at 11:15

## 2019-03-09 RX ADMIN — DILTIAZEM HYDROCHLORIDE SCH MG: 30 TABLET, FILM COATED ORAL at 23:44

## 2019-03-09 RX ADMIN — DILTIAZEM HYDROCHLORIDE SCH MG: 30 TABLET, FILM COATED ORAL at 05:13

## 2019-03-09 RX ADMIN — APIXABAN SCH MG: 2.5 TABLET, FILM COATED ORAL at 18:57

## 2019-03-09 NOTE — PDOC PROGRESS REPORT
Subjective


Progress Note for:: 03/09/19


Subjective:: 





Patient was seen by the bedside she was admitted for the management of A. fib 

with rapid ventricular response, she has no new complaints today


Reason For Visit: 


AFIB WITH RVR,HTN,GERD,HYPERTHYROIDISM








Physical Exam


Vital Signs: 


                                        











Temp Pulse Resp BP Pulse Ox


 


 98.0 F   96   16   116/53 L  96 


 


 03/09/19 15:34  03/09/19 15:34  03/09/19 15:34  03/09/19 15:34  03/09/19 15:34








                                 Intake & Output











 03/08/19 03/09/19 03/10/19





 06:59 06:59 07:59


 


Intake Total  836 


 


Balance  836 


 


Weight  74.4 kg 











General appearance: PRESENT: no acute distress


Eye exam: PRESENT: PERRLA


Respiratory exam: PRESENT: clear to auscultation giacomo


Cardiovascular exam: PRESENT: irregular rhythm, +S1, +S2


GI/Abdominal exam: PRESENT: soft





Results


Laboratory Results: 


                                        





                                 03/08/19 14:30 





                                 03/08/19 14:30 








Impressions: 


                                        





Chest X-Ray  03/08/19 00:00


IMPRESSION:  NO ACUTE RADIOGRAPHIC FINDING IN THE CHEST.


 














Assessment & Plan





- Diagnosis


(1) Atrial fibrillation with rapid ventricular response


Is this a current diagnosis for this admission?: Yes   


Plan: 


Continue treatment

## 2019-03-10 RX ADMIN — CALCIUM SCH MG: 500 TABLET ORAL at 10:28

## 2019-03-10 RX ADMIN — LANSOPRAZOLE SCH MG: 30 TABLET, ORALLY DISINTEGRATING, DELAYED RELEASE ORAL at 05:34

## 2019-03-10 RX ADMIN — DILTIAZEM HYDROCHLORIDE SCH MG: 30 TABLET, FILM COATED ORAL at 23:47

## 2019-03-10 RX ADMIN — DILTIAZEM HYDROCHLORIDE SCH MG: 30 TABLET, FILM COATED ORAL at 05:34

## 2019-03-10 RX ADMIN — MULTIVITAMIN TABLET SCH TAB: TABLET at 10:28

## 2019-03-10 RX ADMIN — DILTIAZEM HYDROCHLORIDE SCH MG: 30 TABLET, FILM COATED ORAL at 12:20

## 2019-03-10 RX ADMIN — APIXABAN SCH MG: 2.5 TABLET, FILM COATED ORAL at 10:28

## 2019-03-10 RX ADMIN — APIXABAN SCH MG: 2.5 TABLET, FILM COATED ORAL at 17:22

## 2019-03-10 RX ADMIN — DILTIAZEM HYDROCHLORIDE SCH MG: 30 TABLET, FILM COATED ORAL at 17:22

## 2019-03-10 NOTE — PDOC PROGRESS REPORT
Subjective


Progress Note for:: 03/10/19


Subjective:: 





Patient seen by the bedside no new complaints


Reason For Visit: 


AFIB WITH RVR,HTN,GERD,HYPERTHYROIDISM








Physical Exam


Vital Signs: 


                                        











Temp Pulse Resp BP Pulse Ox


 


 97.3 F   92   18   150/66 H  99 


 


 03/10/19 11:15  03/10/19 11:15  03/10/19 11:15  03/10/19 11:15  03/10/19 11:15








                                 Intake & Output











 03/09/19 03/10/19 03/11/19





 05:59 06:59 06:59


 


Intake Total   


 


Output Total   


 


Balance   


 


Weight   











General appearance: PRESENT: no acute distress


Eye exam: PRESENT: PERRLA


Respiratory exam: PRESENT: clear to auscultation giacomo


Cardiovascular exam: PRESENT: +S1, +S2


GI/Abdominal exam: PRESENT: soft





Results


Laboratory Results: 


                                        





                                 03/08/19 14:30 





                                 03/08/19 14:30 








Impressions: 


                                        





Chest X-Ray  03/08/19 00:00


IMPRESSION:  NO ACUTE RADIOGRAPHIC FINDING IN THE CHEST.


 














Assessment & Plan





- Diagnosis


(1) Atrial fibrillation with rapid ventricular response


Is this a current diagnosis for this admission?: Yes   


Plan: 


Continue treatment

## 2019-03-11 LAB
ADD MANUAL DIFF: NO
ANION GAP SERPL CALC-SCNC: 9 MMOL/L (ref 5–19)
BASOPHILS # BLD AUTO: 0 10^3/UL (ref 0–0.2)
BASOPHILS NFR BLD AUTO: 0.5 % (ref 0–2)
BUN SERPL-MCNC: 17 MG/DL (ref 7–20)
CALCIUM: 10.2 MG/DL (ref 8.4–10.2)
CHLORIDE SERPL-SCNC: 103 MMOL/L (ref 98–107)
CO2 SERPL-SCNC: 26 MMOL/L (ref 22–30)
EOSINOPHIL # BLD AUTO: 0.1 10^3/UL (ref 0–0.6)
EOSINOPHIL NFR BLD AUTO: 1.7 % (ref 0–6)
ERYTHROCYTE [DISTWIDTH] IN BLOOD BY AUTOMATED COUNT: 14.1 % (ref 11.5–14)
GLUCOSE SERPL-MCNC: 114 MG/DL (ref 75–110)
HCT VFR BLD CALC: 36.2 % (ref 36–47)
HGB BLD-MCNC: 11.5 G/DL (ref 12–15.5)
LYMPHOCYTES # BLD AUTO: 1.1 10^3/UL (ref 0.5–4.7)
LYMPHOCYTES NFR BLD AUTO: 22.5 % (ref 13–45)
MCH RBC QN AUTO: 23.2 PG (ref 27–33.4)
MCHC RBC AUTO-ENTMCNC: 31.7 G/DL (ref 32–36)
MCV RBC AUTO: 73 FL (ref 80–97)
MONOCYTES # BLD AUTO: 0.5 10^3/UL (ref 0.1–1.4)
MONOCYTES NFR BLD AUTO: 10.5 % (ref 3–13)
NEUTROPHILS # BLD AUTO: 3.3 10^3/UL (ref 1.7–8.2)
NEUTS SEG NFR BLD AUTO: 64.8 % (ref 42–78)
PLATELET # BLD: 159 10^3/UL (ref 150–450)
POTASSIUM SERPL-SCNC: 4.1 MMOL/L (ref 3.6–5)
RBC # BLD AUTO: 4.94 10^6/UL (ref 3.72–5.28)
SODIUM SERPL-SCNC: 137.9 MMOL/L (ref 137–145)
TOTAL CELLS COUNTED % (AUTO): 100 %
WBC # BLD AUTO: 5.1 10^3/UL (ref 4–10.5)

## 2019-03-11 RX ADMIN — CALCIUM SCH MG: 500 TABLET ORAL at 10:10

## 2019-03-11 RX ADMIN — MULTIVITAMIN TABLET SCH TAB: TABLET at 10:10

## 2019-03-11 RX ADMIN — DILTIAZEM HYDROCHLORIDE SCH: 30 TABLET, FILM COATED ORAL at 05:38

## 2019-03-11 RX ADMIN — LANSOPRAZOLE SCH MG: 30 TABLET, ORALLY DISINTEGRATING, DELAYED RELEASE ORAL at 05:39

## 2019-03-11 RX ADMIN — APIXABAN SCH MG: 2.5 TABLET, FILM COATED ORAL at 18:32

## 2019-03-11 RX ADMIN — APIXABAN SCH MG: 2.5 TABLET, FILM COATED ORAL at 10:10

## 2019-03-11 NOTE — PDOC PROGRESS REPORT
Subjective


Progress Note for:: 03/11/19


Subjective:: 





She denied any chest pain or difficulty with breathing. Patient continue to use 

snuff even while on admission. She denied any nausea, vomiting or abdominal 

pain. No reported fever or chills. Her her rate has been under acceptable 

control so far today following change of her Cardizem to the CD formulary at 120

mg daily.


Reason For Visit: 


AFIB WITH RVR,HTN,GERD,HYPERTHYROIDISM








Physical Exam


Vital Signs: 


                                        











Temp Pulse Resp BP Pulse Ox


 


 98.2 F   85   17   131/62 H  97 


 


 03/11/19 15:37  03/11/19 15:37  03/11/19 15:37  03/11/19 15:37  03/11/19 15:37








                                 Intake & Output











 03/10/19 03/11/19 03/12/19





 06:59 06:59 06:59


 


Intake Total  1740 532


 


Output Total   


 


Balance  1740 532


 


Weight  78.9 kg 











General appearance: PRESENT: no acute distress, well-developed, well-nourished


Head exam: PRESENT: atraumatic, normocephalic


Eye exam: PRESENT: conjunctiva pink, EOMI, PERRLA.  ABSENT: scleral icterus


Ear exam: PRESENT: normal external ear exam


Mouth exam: PRESENT: moist


Respiratory exam: PRESENT: chest wall tenderness


Cardiovascular exam: PRESENT: RRR.  ABSENT: diastolic murmur, rubs, systolic 

murmur


Vascular exam: PRESENT: normal capillary refill.  ABSENT: pallor


GI/Abdominal exam: PRESENT: normal bowel sounds, soft.  ABSENT: distended, 

guarding, mass, organolmegaly, rebound, tenderness


Extremities exam: ABSENT: pedal edema


Musculoskeletal exam: PRESENT: deformity - related to multiple joints 

involvement with arthritis.


Neurological exam: PRESENT: alert, awake, oriented to person, oriented to place,

oriented to time, oriented to situation, CN II-XII grossly intact.  ABSENT: 

motor sensory deficit


Psychiatric exam: PRESENT: appropriate affect, normal mood.  ABSENT: homicidal 

ideation, suicidal ideation


Skin exam: PRESENT: dry, warm





Results


Laboratory Results: 


                                        





                                 03/08/19 14:30 





                                 03/08/19 14:30 








Impressions: 


                                        





Chest X-Ray  03/08/19 00:00


IMPRESSION:  NO ACUTE RADIOGRAPHIC FINDING IN THE CHEST.


 














Assessment & Plan





- Diagnosis


(1) Atrial fibrillation with rapid ventricular response


Is this a current diagnosis for this admission?: Yes   





(2) HTN (hypertension)


Qualifiers: 


   Hypertension type: essential hypertension   Qualified Code(s): I10 - 

Essential (primary) hypertension   


Is this a current diagnosis for this admission?: Yes   





(3) Hyperthyroidism, subclinical


Is this a current diagnosis for this admission?: Yes   





(4) GERD (gastroesophageal reflux disease)


Qualifiers: 


   Esophagitis presence: without esophagitis   Qualified Code(s): K21.9 - 

Gastro-esophageal reflux disease without esophagitis   


Is this a current diagnosis for this admission?: Yes   





(5) Postmenopausal osteoporosis


Is this a current diagnosis for this admission?: Yes   





(6) Vitamin D deficiency


Is this a current diagnosis for this admission?: Yes   





(7) Osteoarthritis involving multiple joints on both sides of body


Is this a current diagnosis for this admission?: Yes   





- Time


Time Spent with patient: 25-34 minutes


Medications reviewed and adjusted accordingly: Yes


Anticipated discharge: Home


Within: within 24 hours





- Inpatient Certification


Based on my medical assessment, after consideration of the patient's 

comorbidities, presenting symptoms, or acuity I expect that the services needed 

warrant INPATIENT care.: Yes


I certify that my determination is in accordance with my understanding of 

Medicare's requirements for reasonable and necessary INPATIENT services [42 CFR 

412.3e].: Yes


Medical Necessity: Significant Comorbidiites Make Outpatient Treatment Too 

Risky, Need Close Monitoring Due to Risk of Patient Decompensation, Need For 

Continuous Telemetry Monitoring, Risk of Complication if Not Cared For in 

Hospital, Risk of Diagnosis Which Will Require Inpatient Eval/Care/Monitoring


Post Hospital Care: D/C Planner Documentation





- Plan Summary


Plan Summary: 





Maintain on Cardizem  mg po daily. Continue all other current medication 

management. Patient and family are agreeable with possible d/c tomorrow.

## 2019-03-12 VITALS — SYSTOLIC BLOOD PRESSURE: 111 MMHG | DIASTOLIC BLOOD PRESSURE: 63 MMHG

## 2019-03-12 RX ADMIN — CALCIUM SCH MG: 500 TABLET ORAL at 09:03

## 2019-03-12 RX ADMIN — APIXABAN SCH MG: 2.5 TABLET, FILM COATED ORAL at 09:03

## 2019-03-12 RX ADMIN — LANSOPRAZOLE SCH: 30 TABLET, ORALLY DISINTEGRATING, DELAYED RELEASE ORAL at 05:27

## 2019-03-12 RX ADMIN — MULTIVITAMIN TABLET SCH TAB: TABLET at 09:03

## 2019-03-12 NOTE — PDOC DISCHARGE SUMMARY
General





- Admit/Disc Date/PCP


Admission Date/Primary Care Provider: 


  03/08/19 13:28





  KRISTEL EDWIGE





Discharge Date: 03/12/19





- Discharge Diagnosis


(1) Atrial fibrillation with rapid ventricular response


Is this a current diagnosis for this admission?: Yes   





(2) HTN (hypertension)


Is this a current diagnosis for this admission?: Yes   





(3) Hyperthyroidism, subclinical


Is this a current diagnosis for this admission?: Yes   





(4) GERD (gastroesophageal reflux disease)


Is this a current diagnosis for this admission?: Yes   





(5) Postmenopausal osteoporosis


Is this a current diagnosis for this admission?: Yes   





(6) Vitamin D deficiency


Is this a current diagnosis for this admission?: Yes   





(7) Osteoarthritis involving multiple joints on both sides of body


Is this a current diagnosis for this admission?: Yes   





- Additional Information


Prescriptions: 


Apixaban [Eliquis 2.5 mg Tablet] 2.5 mg PO BID #60 tablet


Diltiazem HCl [Cardizem Cd 180 mg Capsule] 1 cap.sr PO DAILY 30 Days #30 cap.sr


Home Medications: 








Omeprazole 20 mg PO DAILY 02/07/14 


Calcium Carbonate [Calcium] 600 mg PO DAILY 03/08/19 


Cholecalciferol (Vitamin D3) [Vitamin D3 1000 Unit Tablet] 1,000 unit PO DAILY 

03/08/19 


Clotrimazole/Betamethasone Dip [Lotrisone Cream] 1 applic TP BID 03/08/19 


Multivitamin [Tab-A-Jese (Multiple Vitamin) Tablet] 1 tab PO DAILY 03/08/19 


Nystatin [Mycostatin Ointment 15 gm] 1 applic TP BID 03/08/19 


Apixaban [Eliquis 2.5 mg Tablet] 2.5 mg PO BID #60 tablet 03/12/19 


Diltiazem HCl [Cardizem Cd 180 mg Capsule] 1 cap.sr PO DAILY 30 Days #30 cap.sr 

03/12/19 











History of Present Illness


History of Present Illness: 


BRUNA CORBETT is a 83 year old female known to my practice who presented to 

the office with daughter complaining about intermittent leg swelling over 

preceding 1 week. She denied any associated chest pain, difficulty with 

breathing, palpitation, irregular heart beat or excessive salt intake. She 

claimed compliance with her medication administration but did not take 

medication so far today. Daughter reported that leg swelling is more in the left

leg. No recent long distance traveling, instrumentation or trauma. Her 

evaluation in the office revealed intermittent irregularly irregular heart 

rhythm with systolic murmur but no significant leg swelling. Her 12 lead EKG 

revealed atrial fibrillation with rapid ventricular rate. She was subsequently 

advised hospitalization for further evaluation and management. She adamantly 

continue to snuff tobacco product. Her morbidities include hypertension, 

hyperthyroidism, GastroEsophageal Reflux Disease, vitamin D deficiency, 

osteoporosis, osteoarthritis, history of polyp of colon and Eczema.








Hospital Course


Hospital Course: 


Patient was admitted from the office due to new onset atrial fibrillation with 

rapid ventricular rate. She was managed with IV Digoxin and oral Cardizem due to

associated low blood pressure. She was maintained on Eliquis 2.5mg po bid for 

anticoagulation therapy. Her heart rate is currently in satisfactory range with 

effort. Her blood pressure remain in good range on current dosage. She remain 

adamant on use of tobacco snuff product despite adequate explanation to 

deleterious effect and possible contribution to her rhythm problem. She will be 

discharge home today and follow up in the office as instructed upon discharge. F

urther evaluation will be completed on outpatient bases.





Physical Exam


Vital Signs: 


                                        











Temp Pulse Resp BP Pulse Ox


 


 97.9 F   119 H  16   145/77 H  94 


 


 03/12/19 04:13  03/12/19 07:00  03/12/19 04:13  03/12/19 04:13  03/12/19 04:13








                                 Intake & Output











 03/11/19 03/12/19 03/13/19





 06:59 06:59 06:59


 


Intake Total 1740 532 


 


Output Total  400 


 


Balance 1740 132 


 


Weight 78.9 kg 78.9 kg 











Physical Exam: 


General appearance: PRESENT: no acute distress, well-developed, well-nourished


Head exam: PRESENT: atraumatic, normocephalic


Eye exam: PRESENT: conjunctiva pink, EOMI, PERRLA.  ABSENT: pallor, scleral 

icterus


Ear exam: PRESENT: normal external ear exam


Mouth exam: PRESENT: moist


Respiratory exam: PRESENT: chest wall tenderness


Cardiovascular exam: PRESENT: RRR.  ABSENT: diastolic murmur, rubs, systolic 

murmur


GI/Abdominal exam: PRESENT: normal bowel sounds, soft.  ABSENT: distended, gua

rding, mass, organomegaly, rebound, tenderness


Extremities exam: ABSENT: pedal edema


Musculoskeletal exam: PRESENT: deformity - related to multiple joints 

involvement with arthritis.


Neurological exam: PRESENT: alert, awake, oriented to person, oriented to place,

oriented to time, oriented to situation, CN II-XII grossly intact.  ABSENT: 

motor sensory deficit


Psychiatric exam: PRESENT: appropriate affect, normal mood.  ABSENT: homicidal 

ideation, suicidal ideation


Skin exam: PRESENT: dry, warm





Results


Laboratory Results: 


                                        





                                 03/11/19 20:26 





                                 03/11/19 20:26 





                                        











  03/11/19 03/11/19





  20:26 20:26


 


WBC  5.1 


 


RBC  4.94 


 


Hgb  11.5 L 


 


Hct  36.2 


 


MCV  73 L 


 


MCH  23.2 L 


 


MCHC  31.7 L 


 


RDW  14.1 H 


 


Plt Count  159 


 


Seg Neutrophils %  64.8 


 


Lymphocytes %  22.5 


 


Monocytes %  10.5 


 


Eosinophils %  1.7 


 


Basophils %  0.5 


 


Absolute Neutrophils  3.3 


 


Absolute Lymphocytes  1.1 


 


Absolute Monocytes  0.5 


 


Absolute Eosinophils  0.1 


 


Absolute Basophils  0.0 


 


Sodium   137.9


 


Potassium   4.1


 


Chloride   103


 


Carbon Dioxide   26


 


Anion Gap   9


 


BUN   17


 


Creatinine   0.93


 


Est GFR ( Amer)   > 60


 


Est GFR (Non-Af Amer)   58 L


 


Glucose   114 H


 


Calcium   10.2











Impressions: 


                                        





Chest X-Ray  03/08/19 00:00


IMPRESSION:  NO ACUTE RADIOGRAPHIC FINDING IN THE CHEST.


 














Qualifiers





- *


PATIENT BEING DISCHARGED WITH ANY OF THE FOLLOWING DIAGNOSIS: No





Plan


Discharge Plan: 


Discharge home today and follow up in the office as instructed upon discharge.

## 2019-12-29 ENCOUNTER — HOSPITAL ENCOUNTER (EMERGENCY)
Dept: HOSPITAL 62 - ER | Age: 84
Discharge: HOME | End: 2019-12-29
Payer: MEDICARE

## 2019-12-29 VITALS — SYSTOLIC BLOOD PRESSURE: 162 MMHG | DIASTOLIC BLOOD PRESSURE: 99 MMHG

## 2019-12-29 DIAGNOSIS — R07.81: Primary | ICD-10-CM

## 2019-12-29 DIAGNOSIS — Z98.51: ICD-10-CM

## 2019-12-29 DIAGNOSIS — I50.9: ICD-10-CM

## 2019-12-29 DIAGNOSIS — I11.0: ICD-10-CM

## 2019-12-29 PROCEDURE — 99284 EMERGENCY DEPT VISIT MOD MDM: CPT
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
## 2020-01-30 ENCOUNTER — HOSPITAL ENCOUNTER (EMERGENCY)
Dept: HOSPITAL 62 - ER | Age: 85
LOS: 1 days | Discharge: HOME | End: 2020-01-31
Payer: MEDICARE

## 2020-01-30 DIAGNOSIS — Z98.51: ICD-10-CM

## 2020-01-30 DIAGNOSIS — R53.1: ICD-10-CM

## 2020-01-30 DIAGNOSIS — I11.0: ICD-10-CM

## 2020-01-30 DIAGNOSIS — I50.9: ICD-10-CM

## 2020-01-30 DIAGNOSIS — R05: ICD-10-CM

## 2020-01-30 DIAGNOSIS — R50.9: ICD-10-CM

## 2020-01-30 DIAGNOSIS — N39.0: Primary | ICD-10-CM

## 2020-01-30 PROCEDURE — 80053 COMPREHEN METABOLIC PANEL: CPT

## 2020-01-30 PROCEDURE — 85025 COMPLETE CBC W/AUTO DIFF WBC: CPT

## 2020-01-30 PROCEDURE — 81001 URINALYSIS AUTO W/SCOPE: CPT

## 2020-01-30 PROCEDURE — 83605 ASSAY OF LACTIC ACID: CPT

## 2020-01-30 PROCEDURE — 87040 BLOOD CULTURE FOR BACTERIA: CPT

## 2020-01-30 PROCEDURE — 96365 THER/PROPH/DIAG IV INF INIT: CPT

## 2020-01-30 PROCEDURE — 87804 INFLUENZA ASSAY W/OPTIC: CPT

## 2020-01-30 PROCEDURE — 71046 X-RAY EXAM CHEST 2 VIEWS: CPT

## 2020-01-30 PROCEDURE — 99284 EMERGENCY DEPT VISIT MOD MDM: CPT

## 2020-01-30 PROCEDURE — 87086 URINE CULTURE/COLONY COUNT: CPT

## 2020-01-30 PROCEDURE — 36415 COLL VENOUS BLD VENIPUNCTURE: CPT

## 2020-01-30 NOTE — ER DOCUMENT REPORT
ED Medical Screen (RME)





- General


Stated Complaint: FEVER,COUGH,WEAKNESS


Time Seen by Provider: 20 22:29


Primary Care Provider: 


KRISTEL GIBBONS MD [Primary Care Provider] - Follow up as needed


Notes: 





Patient is a 84-year-old female with a history of congestive heart failure and 

hypertension who presents emergency department with a chief complaint of 

weakness.  Family states that multiple family members were diagnosed recently 

with the flu and flulike symptoms.  She reports today the patient having urinary

incontinence which is different from her normal.  She reports low-grade fever.  

She reports a cough for 2 days and generalized weakness.  Denies nausea vomiting

diarrhea


TRAVEL OUTSIDE OF THE [place] IN LAST 30 DAYS: No





- Related Data


Allergies/Adverse Reactions: 


                                        





lisinopril Adverse Reaction (Mild, Verified 19 17:44)


   cough











Past Medical History





- Past Medical History


Cardiac Medical History: Reports: Hx Congestive Heart Failure, Hx Hypertension -

medicated


   Denies: Hx Atrial Fibrillation, Hx Coronary Artery Disease, Hx Heart Attack, 

Hx Hypercholesterolemia, Hx Peripheral Vascular Disease, Hx Heart Murmur


Pulmonary Medical History: 


   Denies: Hx Asthma, Hx Bronchitis, Hx COPD, Hx Pneumonia, Hx Tuberculosis


Neurological Medical History: Denies: Hx Cerebrovascular Accident, Hx Seizures, 

Hx Parkinson's Disease


Renal/ Medical History: Denies: Hx Peritoneal Dialysis


GI Medical History: Denies: Hx Hepatitis, Hx Hiatal Hernia, Hx Ulcer


Musculoskeltal Medical History: Denies Hx Multiple Sclerosis


Psychiatric Medical History: Reports: Hx Dementia


Infectious Medical History: Denies: Hx Hepatitis


Past Surgical History: Reports: Hx Tubal Ligation.  Denies: Hx Appendectomy, Hx 

Bowel Surgery, Hx  Section, Hx Cholecystectomy, Hx Coronary Artery 

Bypass Graft, Hx Gastric Bypass Surgery, Hx Herniorrhaphy, Hx Hysterectomy, Hx 

Mastectomy, Hx Open Heart Surgery, Hx Pacemaker, Hx Tonsillectomy





Physical Exam





- Vital signs


Vitals: 





                                        











Temp Pulse Resp BP Pulse Ox


 


 100.5 F H  105 H  20   153/82 H  95 


 


 20 22:18  20 22:18  20 22:18  20 22:18  20 22:18














- Respiratory


Respiratory status: No respiratory distress


Chest status: Nontender


Breath sounds: Normal


Chest palpation: Normal





Course





- Re-evaluation


Re-evalutation: 





20 22:36


I have greeted and performed a rapid initial assessment of this patient.  A 

comprehensive ED assessment and evaluation of the patient, analysis of test 

results and completion of the medical decision making process will be conducted 

by additional ED providers.





- Vital Signs


Vital signs: 





                                        











Temp Pulse Resp BP Pulse Ox


 


 100.5 F H  105 H  20   153/82 H  95 


 


 20 22:18  20 22:18  20 22:18  20 22:18  20 22:18














Doctor's Discharge





- Discharge


Referrals: 


KRISTEL GIBBONS MD [Primary Care Provider] - Follow up as needed

## 2020-01-30 NOTE — RADIOLOGY REPORT (SQ)
XR CHEST 2 VIEWS



EXAM DATE: 1/30/2020 10:34 PM CST



HISTORY: Cough.



COMPARISON: 12/29/2019



FINDINGS:



Normal heart size without pulmonary edema. No focal consolidation

is identified. No pleural effusions or pneumothorax. No acute

bony findings are seen.



IMPRESSION:



No evidence of acute cardiopulmonary disease.

## 2020-01-31 VITALS — DIASTOLIC BLOOD PRESSURE: 80 MMHG | SYSTOLIC BLOOD PRESSURE: 150 MMHG

## 2020-01-31 LAB
A TYPE INFLUENZA AG: NEGATIVE
ADD MANUAL DIFF: NO
ALBUMIN SERPL-MCNC: 4 G/DL (ref 3.5–5)
ALP SERPL-CCNC: 94 U/L (ref 38–126)
ANION GAP SERPL CALC-SCNC: 7 MMOL/L (ref 5–19)
APPEARANCE UR: (no result)
APTT PPP: YELLOW S
AST SERPL-CCNC: 34 U/L (ref 14–36)
B INFLUENZA AG: NEGATIVE
BASOPHILS # BLD AUTO: 0 10^3/UL (ref 0–0.2)
BASOPHILS NFR BLD AUTO: 0.8 % (ref 0–2)
BILIRUB DIRECT SERPL-MCNC: 0.1 MG/DL (ref 0–0.4)
BILIRUB SERPL-MCNC: 0.6 MG/DL (ref 0.2–1.3)
BILIRUB UR QL STRIP: NEGATIVE
BUN SERPL-MCNC: 15 MG/DL (ref 7–20)
CALCIUM: 10.2 MG/DL (ref 8.4–10.2)
CHLORIDE SERPL-SCNC: 106 MMOL/L (ref 98–107)
CO2 SERPL-SCNC: 28 MMOL/L (ref 22–30)
EOSINOPHIL # BLD AUTO: 0 10^3/UL (ref 0–0.6)
EOSINOPHIL NFR BLD AUTO: 0.5 % (ref 0–6)
ERYTHROCYTE [DISTWIDTH] IN BLOOD BY AUTOMATED COUNT: 15.7 % (ref 11.5–14)
GLUCOSE SERPL-MCNC: 95 MG/DL (ref 75–110)
GLUCOSE UR STRIP-MCNC: NEGATIVE MG/DL
HCT VFR BLD CALC: 38 % (ref 36–47)
HGB BLD-MCNC: 11.8 G/DL (ref 12–15.5)
KETONES UR STRIP-MCNC: NEGATIVE MG/DL
LYMPHOCYTES # BLD AUTO: 0.5 10^3/UL (ref 0.5–4.7)
LYMPHOCYTES NFR BLD AUTO: 13.3 % (ref 13–45)
MCH RBC QN AUTO: 22.1 PG (ref 27–33.4)
MCHC RBC AUTO-ENTMCNC: 31.1 G/DL (ref 32–36)
MCV RBC AUTO: 71 FL (ref 80–97)
MONOCYTES # BLD AUTO: 0.7 10^3/UL (ref 0.1–1.4)
MONOCYTES NFR BLD AUTO: 16.4 % (ref 3–13)
NEUTROPHILS # BLD AUTO: 2.8 10^3/UL (ref 1.7–8.2)
NEUTS SEG NFR BLD AUTO: 69 % (ref 42–78)
NITRITE UR QL STRIP: NEGATIVE
PH UR STRIP: 5 [PH] (ref 5–9)
PLATELET # BLD: 191 10^3/UL (ref 150–450)
POTASSIUM SERPL-SCNC: 4.2 MMOL/L (ref 3.6–5)
PROT SERPL-MCNC: 8.1 G/DL (ref 6.3–8.2)
PROT UR STRIP-MCNC: 100 MG/DL
RBC # BLD AUTO: 5.35 10^6/UL (ref 3.72–5.28)
SP GR UR STRIP: 1.02
TOTAL CELLS COUNTED % (AUTO): 100 %
UROBILINOGEN UR-MCNC: 4 MG/DL (ref ?–2)
WBC # BLD AUTO: 4.1 10^3/UL (ref 4–10.5)

## 2020-01-31 NOTE — ER DOCUMENT REPORT
ED General





- General


Chief Complaint: Cough


Stated Complaint: FEVER,COUGH,WEAKNESS


Time Seen by Provider: 20 22:29


Primary Care Provider: 


KRISTEL GIBBONS MD [Primary Care Provider] - Follow up in 3-5 days


Notes: 





84-year-old female presents with generalized weakness, urinary incontinence, 

coughing, and fever.  Per daughter patient has been coughing since Monday.  

States that family has had flu and coughing.  Also states that patient has had 3

episodes of urinary incontinence.  States similar episode a while ago and she 

was diagnosed with a urinary tract infection.  Denies nausea/vomiting, 

confusion, abdominal pain, chest pain, shortness of breath.


TRAVEL OUTSIDE OF THE [place] IN LAST 30 DAYS: No





- Related Data


Allergies/Adverse Reactions: 


                                        





lisinopril Adverse Reaction (Mild, Verified 19 17:44)


   cough








Home Medications: Eliquis.  Cartia.  Omeprazole.  Losartan.  Vitamin D3





Past Medical History





- Social History


Smoking Status: Never Smoker


Chew tobacco use (# tins/day): No


Frequency of alcohol use: None


Drug Abuse: None


Family History: Reviewed & Not Pertinent


Patient has suicidal ideation: No


Patient has homicidal ideation: No





- Past Medical History


Cardiac Medical History: Reports: Hx Congestive Heart Failure, Hx Hypertension -

medicated


   Denies: Hx Atrial Fibrillation, Hx Coronary Artery Disease, Hx Heart Attack, 

Hx Hypercholesterolemia, Hx Peripheral Vascular Disease, Hx Heart Murmur


Pulmonary Medical History: 


   Denies: Hx Asthma, Hx Bronchitis, Hx COPD, Hx Pneumonia, Hx Tuberculosis


Neurological Medical History: Denies: Hx Cerebrovascular Accident, Hx Seizures, 

Hx Parkinson's Disease


Renal/ Medical History: Denies: Hx Peritoneal Dialysis


GI Medical History: Denies: Hx Hepatitis, Hx Hiatal Hernia, Hx Ulcer


Musculoskeletal Medical History: Denies Hx Multiple Sclerosis


Psychiatric Medical History: Reports: Hx Dementia


Infectious Medical History: Denies: Hx Hepatitis


Past Surgical History: Reports: Hx Tubal Ligation.  Denies: Hx Appendectomy, Hx 

Bowel Surgery, Hx  Section, Hx Cholecystectomy, Hx Coronary Artery Bypa

ss Graft, Hx Gastric Bypass Surgery, Hx Herniorrhaphy, Hx Hysterectomy, Hx 

Mastectomy, Hx Open Heart Surgery, Hx Pacemaker, Hx Tonsillectomy





- Immunizations


Hx Pneumococcal Vaccination: 14





Review of Systems





- Review of Systems


Notes: 





Constitutional:.  Positive for for fever.


HENT: Positive for nonproductive coughing.  Negative for sore throat.


Eyes: Negative for visual changes.


Cardiovascular: Negative for chest pain.


Respiratory: Negative for shortness of breath.


Gastrointestinal: Negative for abdominal pain, vomiting or diarrhea.


Genitourinary: Positive for urinary incontinence.  Negative for dysuria.


Musculoskeletal: Negative for back pain.


Skin: Negative for rash.


Neurological: Positive for generalized weakness.  Negative for headaches or 

numbness.





10 point ROS negative except as marked above and in HPI.





Physical Exam





- Vital signs


Vitals: 


                                        











Temp Pulse Resp BP Pulse Ox


 


 100.5 F H  105 H  20   153/82 H  95 


 


 20 22:18  20 22:18  20 22:18  20 22:18  20 22:18














- Notes


Notes: 





GENERAL: Well-appearing, well-nourished and in no acute distress.


HEAD: Atraumatic, normocephalic.


EYES: Pupils equal round and reactive to light, extraocular movements intact, 

sclera anicteric, conjunctiva are normal.


NECK: Normal range of motion, supple without lymphadenopathy or JVD.


LUNGS: Breath sounds clear to auscultation bilaterally and equal.  No wheezes 

rales or rhonchi.


HEART: Regular rate and rhythm without murmurs, rubs or gallops.


ABDOMEN: Soft, nontender.  No guarding, no rebound.  No masses appreciated.


EXTREMITIES: Normal range of motion, no pitting or edema.  No clubbing or 

cyanosis.


NEUROLOGICAL: Cranial nerves II through XII grossly intact.  Normal speech, 

normal gait.


PSYCH: Normal mood, normal affect.


SKIN: Warm, Dry, normal turgor, no rashes or lesions noted.





Course





- Re-evaluation


Re-evalutation: 





20 nontoxic, well-appearing 84-year-old female presents with fever, 

generalized weakness, nonproductive cough, urinary incontinence.  Daughter 

states that last time patient had a urinary incontinence she was diagnosed with 

a urinary tract infection.  Abdomen soft nontender.  Lungs clear to auscultation

bilaterally.  Regular rate and rhythm.  Upon initial arrival to ER patient was 

found to have a fever of 100.5 which improved with Tylenol.  Patient is alert 

and oriented.  Neuro grossly intact.  PE is otherwise unremarkable.  Patient has

no leukocytosis.  UA shows a mild urinary tract infection.  Chest x-ray is 

negative for pneumonia.  Lactic was added and so her blood cultures and urine 

culture.  Rocephin 1 g was also ordered.





20 03:53 Lactic 0.9. Pt is at baseline per daughter. Pt appears nontoxic. 

Pt to be prescribed Keflex with close follow up with PCP. Strict return 

precautions given. Pt given close follow up with PCP in 2-3 days. Discussed all 

results with pt and pt's daughter. Pt and pt's daughter voice understanding and 

agree with plan of care. 











- Vital Signs


Vital signs: 


                                        











Temp Pulse Resp BP Pulse Ox


 


 98.8 F   105 H  20   153/82 H  95 


 


 20 01:05  20 22:18  20 22:18  20 22:18  20 22:18














- Laboratory


Result Diagrams: 


                                 20 00:15





                                 20 00:15


Laboratory results interpreted by me: 


                                        











  20





  00:15 00:15 00:32


 


RBC  5.35 H  


 


Hgb  11.8 L  


 


MCV  71 L  


 


MCH  22.1 L  


 


MCHC  31.1 L  


 


RDW  15.7 H  


 


Mono % (Auto)  16.4 H  


 


Est GFR (MDRD) Non-Af   57 L 


 


Urine Protein    100 H


 


Urine Urobilinogen    4.0 H


 


Ur Leukocyte Esterase    TRACE H














Discharge





- Discharge


Clinical Impression: 


 Acute UTI





Condition: Stable


Disposition: HOME, SELF-CARE


Instructions:  Cephalexin ([facility]), Urinary Tract Infection ([facility])


Additional Instructions: 


Your work-up today shows a urinary tract infection.  Chest x-ray did not show 

any pneumonia.  Please continue to take Tylenol or Motrin for fever.  Take 

Keflex as prescribed and finish all doses unless we call you to change it based 

off your urine culture.  Please take Tessalon Perles as needed for cough.  

Please follow-up with your primary care doctor in 2 to 3 days.  Return 

immediately to ER if you start having any worsening symptoms, including 

increased weakness, fever not controlled by medication, nausea/vomiting, chest 

pain, shortness of breath, coughing up blood, increased urinary incontinence, 

burning while peeing, inability to walk, altered mental status, confusion, 

abdominal pain, diarrhea, constipation, or any other symptoms that are 

concerning to you.


Prescriptions: 


Benzonatate [Tessalon Perles 100 mg Capsule] 100 mg PO Q8HP PRN #40 capsule


 PRN Reason: 


Cephalexin Monohydrate [Keflex 500 mg Capsule] 500 mg PO Q6H 7 Days #28 capsule


Forms:  Treatment of Relative/Child


Referrals: 


KRISTEL GIBBONS MD [Primary Care Provider] - Follow up in 3-5 days

## 2020-05-19 ENCOUNTER — HOSPITAL ENCOUNTER (EMERGENCY)
Dept: HOSPITAL 62 - ER | Age: 85
LOS: 1 days | Discharge: HOME | End: 2020-05-20
Payer: MEDICARE

## 2020-05-19 DIAGNOSIS — R03.0: ICD-10-CM

## 2020-05-19 DIAGNOSIS — N39.0: Primary | ICD-10-CM

## 2020-05-19 DIAGNOSIS — E78.00: ICD-10-CM

## 2020-05-19 DIAGNOSIS — R60.9: ICD-10-CM

## 2020-05-19 LAB
ADD MANUAL DIFF: NO
ALBUMIN SERPL-MCNC: 3.5 G/DL (ref 3.5–5)
ALP SERPL-CCNC: 67 U/L (ref 38–126)
ANION GAP SERPL CALC-SCNC: 5 MMOL/L (ref 5–19)
APPEARANCE UR: (no result)
APTT PPP: YELLOW S
AST SERPL-CCNC: 31 U/L (ref 14–36)
BASOPHILS # BLD AUTO: 0 10^3/UL (ref 0–0.2)
BASOPHILS NFR BLD AUTO: 0.7 % (ref 0–2)
BILIRUB DIRECT SERPL-MCNC: 0 MG/DL (ref 0–0.4)
BILIRUB SERPL-MCNC: 0.3 MG/DL (ref 0.2–1.3)
BILIRUB UR QL STRIP: NEGATIVE
BUN SERPL-MCNC: 13 MG/DL (ref 7–20)
CALCIUM: 9.4 MG/DL (ref 8.4–10.2)
CHLORIDE SERPL-SCNC: 106 MMOL/L (ref 98–107)
CO2 SERPL-SCNC: 27 MMOL/L (ref 22–30)
EOSINOPHIL # BLD AUTO: 0.1 10^3/UL (ref 0–0.6)
EOSINOPHIL NFR BLD AUTO: 1.1 % (ref 0–6)
ERYTHROCYTE [DISTWIDTH] IN BLOOD BY AUTOMATED COUNT: 16.1 % (ref 11.5–14)
GLUCOSE SERPL-MCNC: 91 MG/DL (ref 75–110)
GLUCOSE UR STRIP-MCNC: NEGATIVE MG/DL
HCT VFR BLD CALC: 34.4 % (ref 36–47)
HGB BLD-MCNC: 10.7 G/DL (ref 12–15.5)
KETONES UR STRIP-MCNC: NEGATIVE MG/DL
LYMPHOCYTES # BLD AUTO: 1.3 10^3/UL (ref 0.5–4.7)
LYMPHOCYTES NFR BLD AUTO: 24.3 % (ref 13–45)
MCH RBC QN AUTO: 22.3 PG (ref 27–33.4)
MCHC RBC AUTO-ENTMCNC: 31.2 G/DL (ref 32–36)
MCV RBC AUTO: 72 FL (ref 80–97)
MONOCYTES # BLD AUTO: 0.4 10^3/UL (ref 0.1–1.4)
MONOCYTES NFR BLD AUTO: 7.7 % (ref 3–13)
NEUTROPHILS # BLD AUTO: 3.5 10^3/UL (ref 1.7–8.2)
NEUTS SEG NFR BLD AUTO: 66.2 % (ref 42–78)
NITRITE UR QL STRIP: NEGATIVE
NT PRO BNP: 126 PG/ML (ref ?–450)
PH UR STRIP: 7 [PH] (ref 5–9)
PLATELET # BLD: 213 10^3/UL (ref 150–450)
POTASSIUM SERPL-SCNC: 4.3 MMOL/L (ref 3.6–5)
PROT SERPL-MCNC: 7.2 G/DL (ref 6.3–8.2)
PROT UR STRIP-MCNC: 30 MG/DL
RBC # BLD AUTO: 4.79 10^6/UL (ref 3.72–5.28)
SP GR UR STRIP: 1.02
TOTAL CELLS COUNTED % (AUTO): 100 %
TROPONIN I SERPL-MCNC: < 0.012 NG/ML
UROBILINOGEN UR-MCNC: 4 MG/DL (ref ?–2)
WBC # BLD AUTO: 5.3 10^3/UL (ref 4–10.5)

## 2020-05-19 PROCEDURE — 36415 COLL VENOUS BLD VENIPUNCTURE: CPT

## 2020-05-19 PROCEDURE — 83880 ASSAY OF NATRIURETIC PEPTIDE: CPT

## 2020-05-19 PROCEDURE — 81001 URINALYSIS AUTO W/SCOPE: CPT

## 2020-05-19 PROCEDURE — 71046 X-RAY EXAM CHEST 2 VIEWS: CPT

## 2020-05-19 PROCEDURE — 93010 ELECTROCARDIOGRAM REPORT: CPT

## 2020-05-19 PROCEDURE — 85025 COMPLETE CBC W/AUTO DIFF WBC: CPT

## 2020-05-19 PROCEDURE — 84484 ASSAY OF TROPONIN QUANT: CPT

## 2020-05-19 PROCEDURE — 80053 COMPREHEN METABOLIC PANEL: CPT

## 2020-05-19 PROCEDURE — 93005 ELECTROCARDIOGRAM TRACING: CPT

## 2020-05-19 PROCEDURE — 93971 EXTREMITY STUDY: CPT

## 2020-05-19 PROCEDURE — 99284 EMERGENCY DEPT VISIT MOD MDM: CPT

## 2020-05-19 NOTE — RADIOLOGY REPORT (SQ)
INDICATION:  Bilateral leg swelling left larger than right.



PROCEDURE:  Real-time grayscale, color, and pulse Doppler

ultrasound imaging of the left lower extremity deep venous system

was performed.  42 images.



COMPARISON: None



FINDINGS: 

The common femoral, superficial femoral, and popliteal veins  

demonstrate normal compressibility, phasic flow, augmentation and

gray scale evaluation. There is no evidence of intraluminal

thrombus .  The visualized deep calf veins appear patent.



Contralateral common femoral vein is patent



IMPRESSION:  

No evidence for acute deep venous thrombus from the common

femoral to the popliteal veins.

## 2020-05-19 NOTE — ER DOCUMENT REPORT
ED Medical Screen (RME)





- General


Chief Complaint: Leg Swelling


Stated Complaint: BOTH LEGS SWOLLEN


Time Seen by Provider: 20 20:43


Primary Care Provider: 


KRISTEL GIBBONS MD [Primary Care Provider] - Follow up as needed


Mode of Arrival: Wheelchair


Information source: Patient


Notes: 





84-year-old female presented to ED for complaint of both leg swelling since y

esterday.  The left leg is much larger than the right.  She states she does not 

have a history of heart failure or heart attacks but she does have a history of 

elevated blood pressure and cholesterol.  She is alert oriented answering 

questions appropriately.  The left leg is much more swollen than the right.




















I have greeted and performed a rapid initial assessment of this patient.  A 

comprehensive ED assessment and evaluation of the patient, analysis of test 

results and completion of medical decision making process will be conducted by 

an additional ED providers.


TRAVEL OUTSIDE OF THE [place] IN LAST 30 DAYS: No





- Related Data


Allergies/Adverse Reactions: 


                                        





lisinopril Adverse Reaction (Mild, Verified 20 20:40)


   cough











Past Medical History





- Past Medical History


Cardiac Medical History: Reports: Hx Congestive Heart Failure, Hx Hypertension -

medicated


   Denies: Hx Atrial Fibrillation, Hx Coronary Artery Disease, Hx Heart Attack, 

Hx Hypercholesterolemia, Hx Peripheral Vascular Disease, Hx Heart Murmur


Pulmonary Medical History: 


   Denies: Hx Asthma, Hx Bronchitis, Hx COPD, Hx Pneumonia, Hx Tuberculosis


Neurological Medical History: Denies: Hx Cerebrovascular Accident, Hx Seizures, 

Hx Parkinson's Disease


Renal/ Medical History: Denies: Hx Peritoneal Dialysis


GI Medical History: Denies: Hx Hepatitis, Hx Hiatal Hernia, Hx Ulcer


Musculoskeltal Medical History: Denies Hx Multiple Sclerosis


Psychiatric Medical History: Reports: Hx Dementia


Infectious Medical History: Denies: Hx Hepatitis


Past Surgical History: Reports: Hx Tubal Ligation.  Denies: Hx Appendectomy, Hx 

Bowel Surgery, Hx  Section, Hx Cholecystectomy, Hx Coronary Artery 

Bypass Graft, Hx Gastric Bypass Surgery, Hx Herniorrhaphy, Hx Hysterectomy, Hx 

Mastectomy, Hx Open Heart Surgery, Hx Pacemaker, Hx Tonsillectomy





Physical Exam





- Vital signs


Vitals: 





                                        











Temp Pulse Resp BP Pulse Ox


 


 98.8 F   98   16   149/76 H  94 


 


 20 20:02  20 20:02  20 20:02  20 20:02  20 20:02














Course





- Vital Signs


Vital signs: 





                                        











Temp Pulse Resp BP Pulse Ox


 


 98.8 F   98   16   149/76 H  94 


 


 20 20:02  20 20:02  20 20:02  20 20:02  20 20:02














Doctor's Discharge





- Discharge


Referrals: 


KRISTEL GIBBONS MD [Primary Care Provider] - Follow up as needed

## 2020-05-19 NOTE — ER DOCUMENT REPORT
ED General





- General


Chief Complaint: Leg Swelling


Stated Complaint: BOTH LEGS SWOLLEN


Time Seen by Provider: 20 20:43


Primary Care Provider: 


KRISTEL GIBBONS MD [Primary Care Provider] - Follow up as needed


Mode of Arrival: Wheelchair


TRAVEL OUTSIDE OF THE [place] IN LAST 30 DAYS: No





- HPI


Patient complains to provider of: Bilateral leg swelling


Onset: Other - 2 to 3 days ago


Onset/Duration: Constant


Quality of pain: No pain


Context: 





Patient is a 84-year-old female who presents to the emergency department 

complaining of bilateral lower extremity swelling for the past 2 to 3 days.  

Patient states that she has no history of heart trouble and no history of heart 

failure.  Patient denies personal prior history of DVT or PE or family history 

of thromboembolism.  Patient denies headache, chest pain, diaphoresis, recent 

travel, shortness of breath, nausea and vomiting.  Patient states nothing has 

improved her symptoms.  Patient denies anything that exacerbates symptoms of 

swelling.  Patient denies high salt intake or other dietary indiscretion.  

Patient does not remember exact time of onset of symptoms.  Patient does not 

recall exactly where she first noticed symptom onset.


Associated symptoms: None





- Related Data


Allergies/Adverse Reactions: 


                                        





lisinopril Adverse Reaction (Mild, Verified 20 20:40)


   cough











Past Medical History





- General


Information source: Patient





- Social History


Smoking Status: Never Smoker


Family History: Reviewed & Not Pertinent


Patient has homicidal ideation: No





- Past Medical History


Cardiac Medical History: Reports: Hx Congestive Heart Failure, Hx Hypertension -

medicated


   Denies: Hx Atrial Fibrillation, Hx Coronary Artery Disease, Hx Heart Attack, 

Hx Hypercholesterolemia, Hx Peripheral Vascular Disease, Hx Heart Murmur


Pulmonary Medical History: 


   Denies: Hx Asthma, Hx Bronchitis, Hx COPD, Hx Pneumonia, Hx Tuberculosis


Neurological Medical History: Denies: Hx Cerebrovascular Accident, Hx Seizures, 

Hx Parkinson's Disease


Renal/ Medical History: Denies: Hx Peritoneal Dialysis


GI Medical History: Denies: Hx Hepatitis, Hx Hiatal Hernia, Hx Ulcer


Musculoskeletal Medical History: Denies Hx Multiple Sclerosis


Psychiatric Medical History: Reports: Hx Dementia


Infectious Medical History: Denies: Hx Hepatitis


Past Surgical History: Reports: Hx Tubal Ligation.  Denies: Hx Appendectomy, Hx 

Bowel Surgery, Hx  Section, Hx Cholecystectomy, Hx Coronary Artery 

Bypass Graft, Hx Gastric Bypass Surgery, Hx Herniorrhaphy, Hx Hysterectomy, Hx 

Mastectomy, Hx Open Heart Surgery, Hx Pacemaker, Hx Tonsillectomy





- Immunizations


Hx Pneumococcal Vaccination: 14





Review of Systems





- Review of Systems


Constitutional: No symptoms reported


EENT: No symptoms reported


Cardiovascular: Edema


Respiratory: No symptoms reported


Gastrointestinal: No symptoms reported


Genitourinary: No symptoms reported


Female Genitourinary: No symptoms reported


Musculoskeletal: No symptoms reported


Skin: No symptoms reported


Hematologic/Lymphatic: Other


Neurological/Psychological: No symptoms reported


-: Yes All other systems reviewed and negative





Physical Exam





- Vital signs


Vitals: 


                                        











Temp Pulse Resp BP Pulse Ox


 


 98.8 F   98   16   149/76 H  94 


 


 20 20:02  20 20:02  20 20:02  20 20:02  20 20:02














- Notes


Notes: 


CONSTITUTIONAL 


[Vital signs reviewed, Patient appears comfortable, Alert and oriented X 3, 

Normal stature.]


HEAD 


[Atraumatic, Normocephalic.]


EYES 


[Eyes are normal to inspection, No discharge from eyes, Extraocular muscles 

intact, Sclera are normal, Conjunctiva are normal.]





RESPIRATORY CHEST 


[Chest is nontender, Breath sounds normal, No respiratory distress.]


CARDIOVASCULAR 


[RRR, No murmurs, Normal S1 S2, No rub, No gallop.]


ABDOMEN 


[Abdomen is nontender, No pulsatile masses, No other masses, Bowel sounds 

normal, No distension, No peritoneal signs, No hernias.]


BACK 


[There is no CVA Tenderness, There is no tenderness to palpation, Normal 

inspection.]


UPPER EXTREMITY 


[Inspection normal, No cyanosis, No clubbing, No edema, 2+ radial pulses.]


LOWER EXTREMITY 


[Inspection normal, No cyanosis, No clubbing.  Patient has mild pitting edema of

 her ankles bilaterally, No calf tenderness, 2+ femoral pulses.]


NEURO 


[No focal motor deficits, No focal sensory deficits, Speech normal.]


SKIN 


[Skin is warm, Skin is dry, Skin is normal color.]


LYMPHATIC 


[No adenopathy in neck.]


PSYCHIATRIC 


[Normal affect. ]





Course





- Re-evaluation


Re-evalutation: 





20 00:57


Results of ED MSE discussed with patient.  Prior to discharge.  Emergency signs 

and symptoms, reasons to return to the emergency department discussed with 

patient.





- Vital Signs


Vital signs: 


                                        











Temp Pulse Resp BP Pulse Ox


 


 98.8 F   98   16   149/76 H  94 


 


 20 20:41  20 20:02  20 20:02  20 20:02  20 20:02














- Laboratory


Result Diagrams: 


                                 20 20:55





                                 20 20:55


Laboratory results interpreted by me: 


                                        











  20





  20:05 20:55


 


Hgb   10.7 L


 


Hct   34.4 L


 


MCV   72 L


 


MCH   22.3 L


 


MCHC   31.2 L


 


RDW   16.1 H


 


Urine Protein  30 H 


 


Urine Urobilinogen  4.0 H 


 


Ur Leukocyte Esterase  MODERATE H 














- Diagnostic Test


Radiology reviewed: Reports reviewed





- EKG Interpretation by Me


Additional EKG results interpreted by me: 





20 00:56


EKG obtained on 2020 at 00 41 hours was interpreted by this MD.  Findings: 

Normal sinus rhythm, rate 81, normal axis, P waves preceding QRS complexes, QRS 

complexes appear narrow, there are no obvious patterns of ST segment elevation 

or depression present to suggest acute myocardial ischemia or infarction.  

Impression: Normal sinus rhythm with nonspecific ST segments.





Discharge





- Discharge


Clinical Impression: 


 Mild peripheral edema





UTI (urinary tract infection)


Qualifiers:


 Urinary tract infection type: site unspecified Hematuria presence: without 

hematuria Qualified Code(s): N39.0 - Urinary tract infection, site not specified





Condition: Good


Disposition: HOME, SELF-CARE


Additional Instructions: 


Return to the Emergency Department without delay if any worse.











HOME CARE INSTRUCTIONS & INFORMATION:  Thank you for choosing us for your 

medical needs. We hope you're satisfied with the care you received.  After you 

leave, you must properly care for your problem and, at the same time, observe 

its progress.  Any condition can change.  Some illnesses can change rapidly over

hours or days.  If your condition worsens, return to the Emergency Department or

see your physician promptly.





ABOUT YOUR X-RAYS AND EKG'S:   If you had an EKG or X-rays taken, they have been

read by the Emergency Physician. The X-rays and EKG's will also be read by a 

Radiologist or Cardiologist within 24 hours.  If discrepancies are noted, you 

will be notified by telephone.  Please be certain the ED has a correct telephone

number & address where you can be reached.  Also, realize that some fractures or

abnormalities do not show up on initial X-rays.  If your symptoms continue, see 

your physician.





ABOUT YOUR LABORATORY TEST:   If you had laboratory tests, the results have been

reviewed by the Emergency Physician.  Some test results (for example cultures) 

may not be available for several days.  You will be contacted if any test result

shows you need additional treatment.  Please be certain the ED has a correct 

telephone number and address where you can be reached.





ABOUT YOUR MEDICATIONS:  You will receive instructions on how to take your 

medicine on the prescription label you receive.  Additional information may be 

provided by the Pharmacy.  If you have questions afterwards, call the ED for 

clarification or further instructions.  Some prescribed medications may cause 

drowsiness.  Do not perform tasks such as driving a car or operating machinery 

without consulting your Pharmacist.  If you feel you need a refill of pain 

medication, your condition will need re-evaluation.  Please do not call for a 

refill of any medication.





ABOUT YOUR SIGNATURE:   Signature of this document acknowledges to followin. Understanding that you received emergency treatment and that you may be 

   released before al medical problems are known or treated. Please be certain  

   the ED has a correct phone number & address where you can be reached.


   2. Acknowledgement that you will arrange for follow-up care as recommended.


   3. Authorization for the Emergency Physician to provide information to your 

follow-up Physician in order to maximize your care.





AT ANY TIME, IF YOUR SYMPTOMS CHANGE SIGNIFICANTLY OR WORSEN OR YOU DEVELOP NEW 

SYMPTOMS, RETURN TO THE EMERGENCY DEPARTMENT IMMEDIATELY FOR RE-EVALUATION.





OUR GOAL IS TO PROVIDE EXCELLENT MEDICAL CARE!





WE HOPE THAT WE HAVE MET YOUR EXPECTATIONS DURING YOUR EMERGENCY DEPARTMENT 

VISIT AND THAT YOU FEEL YOU HAVE RECEIVED EXCELLENT CARE!








Edema, Peripheral





     You have swelling in your legs. This is called peripheral edema. It can be 

caused by "leaky capillaries," inflammation, disease of the leg veins, or excess

salt and water in your body. Edema may be a sign of heart, kidney, or liver 

disease. A medical evaluation can determine if there is a serious underlying 

cause for your edema.





Your evaluation today revealed no evidence of a inflammatory or cardiac cause or

clotting disorder.  Your labs showed evidence of a urinary tract infection which

you are being placed on antibiotics for.  You may want to reduce the amount of 

salt in your diet to help with swelling in your legs.  Your EKG showed no 

evidence of heart trouble.  Your labs showed no evidence of heart attack or 

heart failure.  The ultrasound of your legs was negative for a blood clot.





     Avoid prolonged standing. If you must sit for a long time, occasionally get

up and walk around or elevate your legs. Support stockings can be helpful in 

limiting swelling. 


     Call the doctor or return if you develop increased swelling, pain, or 

redness, shortness of breath, chest pain, or any other significant change.





Urinary Tract Infection





     Your evaluation indicates that you have a urinary tract infection. This is 

due to germs growing in the bladder.  This is a common problem.


     This infection usually responds quickly to antibiotics.  Your antibiotic 

should be taken exactly as prescribed.  Drink plenty of fluids -- three to four 

quarts a day.


     Occasionally, a bladder anesthetic will be prescribed to help stop the 

feeling of urgency until the antibiotic has a chance to clear the infection.  

This may cause your urine to be dark orange.


     Certain urine infections require a culture.  If the doctor obtained a 

culture, the results will be back in two days.  You should call to see if a 

change in treatment is needed.


     A repeat urinalysis after you finish treatment is often recommended.  The 

physician will let you know if further testing is required.


     Call the doctor if you develop fever, chills, flank pain, inability to 

urinate, or blood in the urine.





Prescriptions: 


Nitrofurantoin Monohyd/M-Cryst [Macrobid 100 mg Capsule] 100 mg PO BID 7 Days 

#14 cap


Referrals: 


KRISTEL GIBBONS MD [Primary Care Provider] - Follow up as needed

## 2020-05-19 NOTE — RADIOLOGY REPORT ([facility])
CLINICAL INDICATION: Bilateral leg swelling.



TECHNIQUE: PA and lateral views were obtained of the chest



COMPARISON: December 29, 2019.



FINDINGS: The cardiomediastinal  silhouette is prominent but

stable. Unfolded thoracic aorta. The lungs are grossly clear.  No

evidence of effusion or pneumothorax.  Visualized bones are

unremarkable. .



IMPRESSION: No evidence of active intrathoracic disease  .

## 2020-05-20 VITALS — SYSTOLIC BLOOD PRESSURE: 141 MMHG | DIASTOLIC BLOOD PRESSURE: 95 MMHG

## 2020-05-20 NOTE — EKG REPORT
SEVERITY:- ABNORMAL ECG -

SINUS RHYTHM

PROBABLE LEFT VENTRICULAR HYPERTROPHY

PROLONGED QT INTERVAL

:

Confirmed by: Kayla Atkins MD 20-May-2020 07:43:08

## 2020-08-08 ENCOUNTER — HOSPITAL ENCOUNTER (INPATIENT)
Dept: HOSPITAL 62 - ER | Age: 85
LOS: 6 days | Discharge: NURSING, REHAB OR LONG-TERM CARE | DRG: 481 | End: 2020-08-14
Attending: INTERNAL MEDICINE | Admitting: INTERNAL MEDICINE
Payer: MEDICARE

## 2020-08-08 DIAGNOSIS — M81.0: ICD-10-CM

## 2020-08-08 DIAGNOSIS — G30.1: ICD-10-CM

## 2020-08-08 DIAGNOSIS — K21.9: ICD-10-CM

## 2020-08-08 DIAGNOSIS — D64.89: ICD-10-CM

## 2020-08-08 DIAGNOSIS — E55.9: ICD-10-CM

## 2020-08-08 DIAGNOSIS — I10: ICD-10-CM

## 2020-08-08 DIAGNOSIS — E05.80: ICD-10-CM

## 2020-08-08 DIAGNOSIS — I48.20: ICD-10-CM

## 2020-08-08 DIAGNOSIS — Z03.818: ICD-10-CM
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
## 2020-08-08 DIAGNOSIS — F02.80: ICD-10-CM

## 2020-08-08 DIAGNOSIS — Z91.81: ICD-10-CM

## 2020-08-08 DIAGNOSIS — M19.90: ICD-10-CM

## 2020-08-08 DIAGNOSIS — Z79.01: ICD-10-CM

## 2020-08-08 DIAGNOSIS — S72.141A: Primary | ICD-10-CM

## 2020-08-08 LAB
ADD MANUAL DIFF: NO
ANION GAP SERPL CALC-SCNC: 7 MMOL/L (ref 5–19)
ANION GAP SERPL CALC-SCNC: 7 MMOL/L (ref 5–19)
APTT BLD: 26.4 SEC (ref 23.5–35.8)
BASOPHILS # BLD AUTO: 0 10^3/UL (ref 0–0.2)
BASOPHILS NFR BLD AUTO: 0.7 % (ref 0–2)
BUN SERPL-MCNC: 12 MG/DL (ref 7–20)
BUN SERPL-MCNC: 13 MG/DL (ref 7–20)
CALCIUM: 9.8 MG/DL (ref 8.4–10.2)
CALCIUM: 9.8 MG/DL (ref 8.4–10.2)
CHLORIDE SERPL-SCNC: 107 MMOL/L (ref 98–107)
CHLORIDE SERPL-SCNC: 109 MMOL/L (ref 98–107)
CK MB SERPL-MCNC: 1.83 NG/ML (ref ?–4.55)
CK MB SERPL-MCNC: 1.86 NG/ML (ref ?–4.55)
CO2 SERPL-SCNC: 22 MMOL/L (ref 22–30)
CO2 SERPL-SCNC: 22 MMOL/L (ref 22–30)
EOSINOPHIL # BLD AUTO: 0.1 10^3/UL (ref 0–0.6)
EOSINOPHIL NFR BLD AUTO: 1.3 % (ref 0–6)
ERYTHROCYTE [DISTWIDTH] IN BLOOD BY AUTOMATED COUNT: 16.7 % (ref 11.5–14)
GLUCOSE SERPL-MCNC: 107 MG/DL (ref 75–110)
GLUCOSE SERPL-MCNC: 126 MG/DL (ref 75–110)
HCT VFR BLD CALC: 31.4 % (ref 36–47)
HGB BLD-MCNC: 10 G/DL (ref 12–15.5)
INR PPP: 1.14
LYMPHOCYTES # BLD AUTO: 2.8 10^3/UL (ref 0.5–4.7)
LYMPHOCYTES NFR BLD AUTO: 40.2 % (ref 13–45)
MCH RBC QN AUTO: 23.2 PG (ref 27–33.4)
MCHC RBC AUTO-ENTMCNC: 31.8 G/DL (ref 32–36)
MCV RBC AUTO: 73 FL (ref 80–97)
MONOCYTES # BLD AUTO: 0.4 10^3/UL (ref 0.1–1.4)
MONOCYTES NFR BLD AUTO: 5.2 % (ref 3–13)
NEUTROPHILS # BLD AUTO: 3.7 10^3/UL (ref 1.7–8.2)
NEUTS SEG NFR BLD AUTO: 52.6 % (ref 42–78)
PLATELET # BLD: 208 10^3/UL (ref 150–450)
POTASSIUM SERPL-SCNC: 3.7 MMOL/L (ref 3.6–5)
POTASSIUM SERPL-SCNC: 4.3 MMOL/L (ref 3.6–5)
PROTHROMBIN TIME: 14.8 SEC (ref 11.4–15.4)
RBC # BLD AUTO: 4.3 10^6/UL (ref 3.72–5.28)
TOTAL CELLS COUNTED % (AUTO): 100 %
TROPONIN I SERPL-MCNC: 0.02 NG/ML
TROPONIN I SERPL-MCNC: 0.02 NG/ML
WBC # BLD AUTO: 7 10^3/UL (ref 4–10.5)

## 2020-08-08 PROCEDURE — 86901 BLOOD TYPING SEROLOGIC RH(D): CPT

## 2020-08-08 PROCEDURE — 70450 CT HEAD/BRAIN W/O DYE: CPT

## 2020-08-08 PROCEDURE — 93010 ELECTROCARDIOGRAM REPORT: CPT

## 2020-08-08 PROCEDURE — C1769 GUIDE WIRE: HCPCS

## 2020-08-08 PROCEDURE — 36415 COLL VENOUS BLD VENIPUNCTURE: CPT

## 2020-08-08 PROCEDURE — 84484 ASSAY OF TROPONIN QUANT: CPT

## 2020-08-08 PROCEDURE — 82306 VITAMIN D 25 HYDROXY: CPT

## 2020-08-08 PROCEDURE — 86900 BLOOD TYPING SEROLOGIC ABO: CPT

## 2020-08-08 PROCEDURE — 93005 ELECTROCARDIOGRAM TRACING: CPT

## 2020-08-08 PROCEDURE — 87635 SARS-COV-2 COVID-19 AMP PRB: CPT

## 2020-08-08 PROCEDURE — 86920 COMPATIBILITY TEST SPIN: CPT

## 2020-08-08 PROCEDURE — S0028 INJECTION, FAMOTIDINE, 20 MG: HCPCS

## 2020-08-08 PROCEDURE — 99285 EMERGENCY DEPT VISIT HI MDM: CPT

## 2020-08-08 PROCEDURE — 84443 ASSAY THYROID STIM HORMONE: CPT

## 2020-08-08 PROCEDURE — 83970 ASSAY OF PARATHORMONE: CPT

## 2020-08-08 PROCEDURE — 85025 COMPLETE CBC W/AUTO DIFF WBC: CPT

## 2020-08-08 PROCEDURE — 87040 BLOOD CULTURE FOR BACTERIA: CPT

## 2020-08-08 PROCEDURE — 93306 TTE W/DOPPLER COMPLETE: CPT

## 2020-08-08 PROCEDURE — 72192 CT PELVIS W/O DYE: CPT

## 2020-08-08 PROCEDURE — 36430 TRANSFUSION BLD/BLD COMPNT: CPT

## 2020-08-08 PROCEDURE — 84100 ASSAY OF PHOSPHORUS: CPT

## 2020-08-08 PROCEDURE — 85610 PROTHROMBIN TIME: CPT

## 2020-08-08 PROCEDURE — 82550 ASSAY OF CK (CPK): CPT

## 2020-08-08 PROCEDURE — 96375 TX/PRO/DX INJ NEW DRUG ADDON: CPT

## 2020-08-08 PROCEDURE — C1713 ANCHOR/SCREW BN/BN,TIS/BN: HCPCS

## 2020-08-08 PROCEDURE — 86850 RBC ANTIBODY SCREEN: CPT

## 2020-08-08 PROCEDURE — 83735 ASSAY OF MAGNESIUM: CPT

## 2020-08-08 PROCEDURE — 80048 BASIC METABOLIC PNL TOTAL CA: CPT

## 2020-08-08 PROCEDURE — 72125 CT NECK SPINE W/O DYE: CPT

## 2020-08-08 PROCEDURE — 96374 THER/PROPH/DIAG INJ IV PUSH: CPT

## 2020-08-08 PROCEDURE — 71045 X-RAY EXAM CHEST 1 VIEW: CPT

## 2020-08-08 PROCEDURE — 01230 ANES OPN UPPER 2/3 FEMUR NOS: CPT

## 2020-08-08 PROCEDURE — 83880 ASSAY OF NATRIURETIC PEPTIDE: CPT

## 2020-08-08 PROCEDURE — 99140 ANES COMP EMERGENCY COND: CPT

## 2020-08-08 PROCEDURE — P9016 RBC LEUKOCYTES REDUCED: HCPCS

## 2020-08-08 PROCEDURE — 85730 THROMBOPLASTIN TIME PARTIAL: CPT

## 2020-08-08 PROCEDURE — 82553 CREATINE MB FRACTION: CPT

## 2020-08-08 PROCEDURE — C9803 HOPD COVID-19 SPEC COLLECT: HCPCS

## 2020-08-08 RX ADMIN — FAMOTIDINE SCH MG: 10 INJECTION INTRAVENOUS at 21:07

## 2020-08-08 RX ADMIN — DILTIAZEM HYDROCHLORIDE SCH MG: 180 CAPSULE, EXTENDED RELEASE ORAL at 21:07

## 2020-08-08 RX ADMIN — DOCUSATE SODIUM SCH: 100 CAPSULE, LIQUID FILLED ORAL at 17:21

## 2020-08-08 RX ADMIN — OXYCODONE AND ACETAMINOPHEN PRN TAB: 5; 325 TABLET ORAL at 15:27

## 2020-08-08 NOTE — RADIOLOGY REPORT (SQ)
EXAM DESCRIPTION:  CT PELVIS WITHOUT



IMAGES COMPLETED DATE/TIME:  8/8/2020 9:56 am



REASON FOR STUDY:  R hip pain fall



COMPARISON:  None.



TECHNIQUE:  CT scan of the pelvis performed without intravenous or oral contrast.  Images reviewed wi
th soft tissue and bone windows.  Reconstructed coronal and sagittal MPR images reviewed.  All images
 stored on PACS.

All CT scanners at this facility use dose modulation, iterative reconstruction, and/or weight based d
osing when appropriate to reduce radiation dose to as low as reasonably achievable (ALARA).

[facility]: Dose Right  [facility]: CareDose    [facility]: Dose Right    [facility]: Teradose 4D    [facility]: Smart Nintex



RADIATION DOSE:  CT Rad equipment meets quality standard of care and radiation dose reduction techniq
ues were employed. CTDIvol: 24.7 mGy. DLP: 720 mGy-cm. mGy.



LIMITATIONS:  None.



FINDINGS:  PELVIC BONES: No acute fracture. No worrisome bone lesions.

VISUALIZED SPINE: No acute findings.

HIP(S): There is an acute intratrochanteric fracture of the right proximal femur.  Mild displacement 
of the greater and lesser trochanters.  Mild angulation of the distal fracture fragment medially.  No
rmal contour of the femoral head.  Normal femoroacetabular alignment.

PELVIC SOFT TISSUES: No significant findings.

EXTRAPELVIC SOFT TISSUES: No significant findings.

OTHER: No other significant finding.



IMPRESSION:  Acute intertrochanteric fracture right femur.  Mild displacement of the greater and less
er trochanters.



TECHNICAL DOCUMENTATION:  JOB ID:  [id number]

Quality ID # 436: Final reports with documentation of one or more dose reduction techniques (e.g., Au
tomated exposure control, adjustment of the mA and/or kV according to patient size, use of iterative 
reconstruction technique)

 2011 [employer]- All Rights Reserved



Reading location - IP/workstation name: [id number]

## 2020-08-08 NOTE — PDOC H&P
History of Present Illness


Admission Date/PCP: 


  20 12:29





  KRISTEL GIBBONS





Patient complains of: Fall


History of Present Illness: 


BRUNA CORBETT is a 84 year old female


This is a 84-year-old female of Dr. Gibbons with a history of the dementia 

atrial fibrillation on Eliquis hypertension hypothyroidism and vitamin D 

deficiency she chronically slow mental state per family who presented to the ER 

via EMS after a chief complaint of the fall.


EMS reports the family states that she falls all the times.


Today's been EMS called because she fall which between 2 objects in the home and

the family could not get to her or help her up


EMS was able to extract her.


They report this he was ambulatory on the skin and complains of right thigh pain


Patient and family do not report hitting her head or any loss of consciousness


Patient initial work-up in the emergency departments suggest a right hip 

fracture and also call the orthopedic and suggest to admit in a cardiac and 

medical clearance


When I saw the patient on the floor patient is alert awake but underlying 

dementia is





                                                                                

                                                                                

                  





Past Medical History


Cardiac Medical History: Reports: Congestive Heart Failure, Hypertension - 

medicated


   Denies: Atrial Fibrillation, Coronary Artery Disease, Myocardial Infarction, 

Hyperlipidema, Peripheral Vascular Disease, Heart Murmur


Pulmonary Medical History: 


   Denies: Asthma, Bronchitis, Chronic Obstructive Pulmonary Disease (COPD), 

Pneumonia, Tuberculosis


Neurological Medical History: 


   Denies: Seizures


GI Medical History: 


   Denies: Hepatitis, Hiatal Hernia


Psychiatric Medical History: Reports: Dementia


   Denies: Depression


Hematology: 


   Denies: Anemia, Sickle Cell Disease





Past Surgical History


Past Surgical History: Reports: Tubal Ligation


   Denies: Amputation, Appendectomy,  Section, Cholecystectomy, Coronary

Artery Bypass Graft, Gastric Bypass Surgery, Herniorrhaphy, Hysterectomy, 

Mastectomy, Pacemaker, Tonsillectomy





Social History


Information Source: Patient


Smoking Status: Never Smoker


Electronic Cigarette use?: No


Frequency of Alcohol Use: None


Hx Recreational Drug Use: No


Drugs: None


Hx Prescription Drug Abuse: No





Family History


Family History: Reviewed & Not Pertinent


Parental Family History Reviewed: Yes


Children Family History Reviewed: Yes


Sibling(s) Family History Reviewed.: Yes





Medication/Allergy


Home Medications: 








Omeprazole 20 mg PO DAILY 14 


Multivitamin [Tab-A-Jese (Multiple Vitamin) Tablet] 1 tab PO DAILY 19 


Apixaban [Eliquis 2.5 mg Tablet] 2.5 mg PO BID #60 tablet 19 


Ascorbic Acid [Vitamin C 500 mg Tablet] 500 mg PO DAILY 20 


Diltiazem HCl [Cardizem Cd 180 mg Capsule] 180 mg PO BID 20 


Ferrous Sulfate [Feosol 325 mg Tablet] 325 mg PO DAILY 20 


Furosemide [Lasix 20 mg Tablet] 20 mg PO DAILY 20 


Losartan Potassium [Cozaar 50 mg Tablet] 50 mg PO DAILY 20 


Megestrol Acetate [Megace 20 mg Tablet] 40 mg PO DAILY 20 








Allergies/Adverse Reactions: 


                                        





lisinopril Adverse Reaction (Mild, Verified 20 20:40)


   cough











Review of Systems


Constitutional: ABSENT: chills, fever(s), headache(s), weight gain, weight loss


Eyes: ABSENT: visual disturbances


Ears: ABSENT: hearing changes


Cardiovascular: ABSENT: chest pain, dyspnea on exertion, edema, orthropnea, 

palpitations


Respiratory: ABSENT: cough, hemoptysis


Gastrointestinal: ABSENT: abdominal pain, constipation, diarrhea, hematemesis, 

hematochezia, nausea, vomiting


Genitourinary: ABSENT: dysuria, hematuria


Musculoskeletal: ABSENT: joint swelling


Integumentary: ABSENT: rash, wounds


Neurological: ABSENT: abnormal gait, abnormal speech, confusion, dizziness, 

focal weakness, syncope


Psychiatric: ABSENT: anxiety, depression, homidical ideation, suicidal ideation


Endocrine: ABSENT: cold intolerance, heat intolerance, menstrual abnormalities, 

polydipsia, polyuria


Hematologic/Lymphatic: ABSENT: easy bleeding, easy bruising, lymphadenopathy





Physical Exam


Vital Signs: 


                                        











Temp Pulse Resp BP Pulse Ox


 


 98.2 F   92   18   153/90 H  97 


 


 20 15:20  20 15:20  20 15:20  20 15:20  20 15:20








                                 Intake & Output











 20





 06:59 06:59 06:59


 


Weight   68.8 kg











General appearance: PRESENT: no acute distress, well-developed, well-nourished


Head exam: PRESENT: atraumatic, normocephalic


Eye exam: PRESENT: conjunctiva pink, EOMI, PERRLA.  ABSENT: scleral icterus


Ear exam: PRESENT: normal external ear exam


Mouth exam: PRESENT: moist, tongue midline


Neck exam: PRESENT: full ROM.  ABSENT: carotid bruit, JVD, lymphadenopathy, 

thyromegaly


Respiratory exam: PRESENT: decreased breath sounds


Cardiovascular exam: PRESENT: RRR.  ABSENT: diastolic murmur, rubs, systolic 

murmur


Pulses: PRESENT: normal dorsalis pedis pul, +2 pedal pulses bilateral


Vascular exam: PRESENT: normal capillary refill


GI/Abdominal exam: PRESENT: normal bowel sounds, soft.  ABSENT: distended, 

guarding, mass, organolmegaly, rebound, tenderness


Rectal exam: PRESENT: deferred


Extremities exam: ABSENT: pedal edema


Additional comments: 





Right lower extremity is externally rotated the hip area was slightly shorter 

than the left extremities


Neurological exam: PRESENT: alert, awake.  ABSENT: motor sensory deficit


Psychiatric exam: PRESENT: appropriate affect, normal mood.  ABSENT: homicidal 

ideation, suicidal ideation


Skin exam: PRESENT: dry, intact, warm.  ABSENT: cyanosis, rash





Results


Laboratory Results: 


                                        





                                 20 10:18 





                                        











  20





  10:18 10:18 10:18


 


WBC  7.0  


 


RBC  4.30  


 


Hgb  10.0 L  


 


Hct  31.4 L  


 


MCV  73 L  


 


MCH  23.2 L  


 


MCHC  31.8 L  


 


RDW  16.7 H  


 


Plt Count  208  


 


Seg Neutrophils %  52.6  


 


Sodium   137.6 


 


Potassium   3.7 


 


Chloride   109 H 


 


Carbon Dioxide   22 


 


Anion Gap   7 


 


BUN   13 


 


Creatinine   0.89 


 


Est GFR ( Amer)   > 60 


 


Est GFR (Non-Af Amer)   


 


Glucose   126 H 


 


Calcium   9.8 


 


TSH    2.62














  20





  10:18


 


WBC 


 


RBC 


 


Hgb 


 


Hct 


 


MCV 


 


MCH 


 


MCHC 


 


RDW 


 


Plt Count 


 


Seg Neutrophils % 


 


Sodium  Cancelled


 


Potassium  Cancelled


 


Chloride  Cancelled


 


Carbon Dioxide  Cancelled


 


Anion Gap  Cancelled


 


BUN  Cancelled


 


Creatinine  Cancelled


 


Est GFR ( Amer)  Cancelled


 


Est GFR (Non-Af Amer)  Cancelled


 


Glucose  Cancelled


 


Calcium  Cancelled


 


TSH 








                                        











  20





  10:18 10:18


 


Creatine Kinase  62 


 


CK-MB (CK-2)   1.86


 


Troponin I   0.020











Impressions: 


                                        





Cervical Spine CT  20 10:21


IMPRESSION:


1. No acute fracture or dislocation of the cervical spine.


2. Mild degenerative disc disease and spondylosis.


3. Enlarged right thyroid lobe, unchanged from prior.


 








Head CT  20 10:21


IMPRESSION:


1. No acute intracranial hemorrhage, mass, or evidence of acute territorial 

infarct.


2. Severe chronic small vessel ischemic change.


EVIDENCE OF ACUTE STROKE: NO.


 








Pelvis CT  20 10:21


IMPRESSION:  Acute intertrochanteric fracture right femur.  Mild displacement of

the greater and lesser trochanters.


 








Hip/Pelvis X-Ray  20 11:50


IMPRESSION:  Intertrochanteric fracture right femoral neck.


 








Chest X-Ray  20 11:54


IMPRESSION:  NO ACUTE RADIOGRAPHIC FINDING IN THE CHEST.


 














Assessment & Plan





- Diagnosis


(1) Intertrochanteric fracture


Qualifiers: 


   Encounter type: initial encounter   Fracture type: closed   Fracture 

alignment: displaced   Laterality: right   Qualified Code(s): S72.141A - 

Displaced intertrochanteric fracture of right femur, initial encounter for 

closed fracture   


Is this a current diagnosis for this admission?: Yes   


Plan: 


Consult the Ortho











(2) Chronic atrial fibrillation


Is this a current diagnosis for this admission?: Yes   


Plan: 


Currently on Eliquis we will consult the cardiology


Hold the Eliquis consult the cardiology








(3) Dementia


Qualifiers: 


   Dementia type: unspecified type 


Is this a current diagnosis for this admission?: Yes   





(4) HTN (hypertension)


Qualifiers: 


   Hypertension type: essential hypertension   Qualified Code(s): I10 - 

Essential (primary) hypertension   


Is this a current diagnosis for this admission?: Yes   





(5) Hyperthyroidism, subclinical


Is this a current diagnosis for this admission?: Yes   





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


Is this a current diagnosis for this admission?: Yes   





(7) Vitamin D deficiency


Is this a current diagnosis for this admission?: Yes   





- Time


Time Spent: 50 to 70 Minutes


Medications reviewed and adjusted accordingly: Yes


Anticipated Discharge Disposition: Skilled Nursing Facility


Anticipated Discharge Timeframe: within 72 hours





- Inpatient Certification


Based on my medical assessment, after consideration of the patient's com

orbidities, presenting symptoms, or acuity I expect that the services needed 

warrant INPATIENT care.: Yes


I certify that my determination is in accordance with my understanding of 

Medicare's requirements for reasonable and necessary INPATIENT services [42 CFR 

412.3e].: Yes


Medical Necessity: Significant Comorbidiites Make Outpatient Treatment Too 

Risky, Need Close Monitoring Due to Risk of Patient Decompensation, Need for 

Surgery


Post Hospital Care: D/C Planner Documentation





- Plan Summary


Plan Summary: 





Admit the patient's and see MD orders

## 2020-08-08 NOTE — RADIOLOGY REPORT (SQ)
EXAM DESCRIPTION:  CT HEAD WITHOUT



IMAGES COMPLETED DATE/TIME:  8/8/2020 9:56 am



REASON FOR STUDY:  fall on thinners



COMPARISON:  2/9/2019



TECHNIQUE:  Axial images acquired through the brain without intravenous contrast.  Images reviewed wi
th bone, brain and subdural windows.  Additional sagittal and coronal reconstructions were generated.
 Images stored on PACS.

All CT scanners at this facility use dose modulation, iterative reconstruction, and/or weight based d
osing when appropriate to reduce radiation dose to as low as reasonably achievable (ALARA).

[facility]: Dose Right  [facility]: CareDose    [facility]: Dose Right    [facility]: Teradose 4D    [facility]: Smart Technologies



RADIATION DOSE:  CT Rad equipment meets quality standard of care and radiation dose reduction techniq
ues were employed. CTDIvol: 53.2 mGy. DLP: 937 mGy-cm. mGy.



LIMITATIONS:  None.



FINDINGS:  VENTRICLES: There is moderate cerebral and cerebellar atrophy.  No evidence of hydrocephal
us.

CEREBRUM: No masses.  No hemorrhage.  No midline shift.  No evidence for acute infarction. Normal gra
y-white matter differentiation.  Severe diffuse periventricular, deep, and subcortical white matter h
ypodense attenuation consistent with chronic small vessel ischemic change.  There is intracranial ath
erosclerosis.

CEREBELLUM: No masses.  No hemorrhage.  No alteration of density.  No evidence for acute infarction.

EXTRAAXIAL SPACES: No fluid collections.  No masses.

ORBITS AND GLOBE: No intra- or extraconal masses.  Normal contour of globe without masses.

CALVARIUM: No fracture.

PARANASAL SINUSES: No fluid or mucosal thickening.

SOFT TISSUES: No mass or hematoma.

OTHER: No other significant finding.



IMPRESSION:

1. No acute intracranial hemorrhage, mass, or evidence of acute territorial infarct.

2. Severe chronic small vessel ischemic change.

EVIDENCE OF ACUTE STROKE: NO.



COMMENT:  Quality ID # 436: Final reports with documentation of one or more dose reduction techniques
 (e.g., Automated exposure control, adjustment of the mA and/or kV according to patient size, use of 
iterative reconstruction technique)



TECHNICAL DOCUMENTATION:  JOB ID:  [id number]

 2011 [employer]- All Rights Reserved



Reading location - IP/workstation name: [id number]

## 2020-08-08 NOTE — EKG REPORT
SEVERITY:- ABNORMAL ECG -

SINUS RHYTHM

LVH WITH SECONDARY REPOLARIZATION ABNORMALITY

:

Confirmed by: Chris Gray MD 08-Aug-2020 19:43:16

## 2020-08-08 NOTE — ER DOCUMENT REPORT
ED General





- General


Chief Complaint: Fall


Stated Complaint: FALL


Primary Care Provider: 


KRISTEL GIBBONS MD [Primary Care Provider] - Follow up as needed


Notes: 





Patient is an 84-year-old -American female with a history of dementia, at

rial fibrillation on Eliquis, hypertension, hypothyroidism and vitamin D 

deficiency who has a chronically slowed mental state per family history who 

presents to the ER via EMS after chief complaint of fall.  EMS reports the 

family states that she falls all the time.  Today they called EMS because she 

fell wedged between 2 objects in the home and the family could not get to her to

help her up.  EMS was able to extract her.  They report that she was ambulatory 

on scene and complained of right thigh pain.  They report the patient and family

do not report hitting her head or any loss of consciousness.  Stroke screen from

EMS was negative.  Per history obtained from EMS and family this is her normal 

baseline mental status.  EMS reports she is answering questions appropriately 

and repeating phrases appropriately.  The patient denies any other pain with 

exception of the right thigh.  Denies any numbness tingling or weakness.  No 

chest pain or shortness of breath.  No abdominal pain.  No headache or neck 

pain.


TRAVEL OUTSIDE OF THE [place] IN LAST 30 DAYS: No





- Related Data


Allergies/Adverse Reactions: 


                                        





lisinopril Adverse Reaction (Mild, Verified 20 20:40)


   cough











Past Medical History





- Social History


Smoking Status: Unknown if Ever Smoked


Family History: Reviewed & Not Pertinent





- Past Medical History


Cardiac Medical History: Reports: Hx Congestive Heart Failure, Hx Hypertension -

medicated


   Denies: Hx Atrial Fibrillation, Hx Coronary Artery Disease, Hx Heart Attack, 

Hx Hypercholesterolemia, Hx Peripheral Vascular Disease, Hx Heart Murmur


Pulmonary Medical History: 


   Denies: Hx Asthma, Hx Bronchitis, Hx COPD, Hx Pneumonia, Hx Tuberculosis


Neurological Medical History: Denies: Hx Cerebrovascular Accident, Hx Seizures, 

Hx Parkinson's Disease


Renal/ Medical History: Denies: Hx Peritoneal Dialysis


GI Medical History: Denies: Hx Hepatitis, Hx Hiatal Hernia, Hx Ulcer


Musculoskeletal Medical History: Denies Hx Multiple Sclerosis


Psychiatric Medical History: Reports: Hx Dementia


Infectious Medical History: Denies: Hx Hepatitis


Past Surgical History: Reports: Hx Tubal Ligation.  Denies: Hx Appendectomy, Hx 

Bowel Surgery, Hx  Section, Hx Cholecystectomy, Hx Coronary Artery 

Bypass Graft, Hx Gastric Bypass Surgery, Hx Herniorrhaphy, Hx Hysterectomy, Hx 

Mastectomy, Hx Open Heart Surgery, Hx Pacemaker, Hx Tonsillectomy





- Immunizations


Hx Pneumococcal Vaccination: 14





Review of Systems





- Review of Systems


Notes: 





As per HPI





Physical Exam





- Vital signs


Vitals: 


                                        











Temp Pulse Resp BP Pulse Ox


 


 98.1 F   86   14   182/79 H  96 


 


 20 10:12  20 10:12  20 10:12  20 10:12  20 10:12














- General


General appearance: Appears well, Alert


In distress: None





- HEENT


Neck: Normal, Supple, Other - Nontender.  Full range of motion





- Respiratory


Respiratory status: No respiratory distress


Chest status: Nontender


Breath sounds: Normal


Chest palpation: Normal





- Cardiovascular


Rhythm: Regular


Heart sounds: Normal auscultation





- Abdominal


Inspection: Normal


Distension: No distension


Bowel sounds: Normal


Tenderness: Nontender


Organomegaly: No organomegaly





- Extremities


Notes: 





Right lower extremity is externally rotated at the hip appears slightly shorter 

than the left extremity which is in full extension toes up.  2+ DP bilaterally. 

Patient nonambulatory here.  She resists my efforts to internally rotate the 

right leg.  Tender palpation mid right thigh.





- Neurological


Neuro grossly intact: Yes


Cognition: Normal


Orientation: AAOx4





- Psychological


Associated symptoms: Normal affect, Normal mood





- Skin


Skin Temperature: Warm


Skin Moisture: Dry


Skin Color: Normal





Course





- Re-evaluation


Re-evalutation: 





20 11:42


CT scan showing a right hip fracture.  Page sent out to orthopedist on-call, Dr. Tovar.  Awaiting return call.  Patient's daughter has arrived at bedside I 

discussed with her story from EMS and our findings and she corroborates the 

story.  Patient is given pain medications and is more comfortable.


20 11:58


Spoke with orthopedist, Dr. Tovar who agreed with plan for admission.  He will 

plan to operate on the right hip tomorrow morning if the patient is medically 

cleared by that point.  Requested medicine admit the patient given her medical 

history.  Called and spoke with Dr. Hartley, hospitalist, who agreed to admit 

the patient to telemetry.  Patient is stable and appropriate for admission at 

this time.





- Vital Signs


Vital signs: 


                                        











Temp Pulse Resp BP Pulse Ox


 


 98.1 F   86   14   182/79 H  96 


 


 20 10:12  08/08/20 10:12  20 10:12  20 10:12  20 10:12














- Laboratory


Result Diagrams: 


                                 20 10:18





                                 20 10:18


Laboratory results interpreted by me: 


                                        











  20





  10:18 10:18


 


Hgb  10.0 L 


 


Hct  31.4 L 


 


MCV  73 L 


 


MCH  23.2 L 


 


MCHC  31.8 L 


 


RDW  16.7 H 


 


Chloride   109 H


 


Glucose   126 H














Discharge





- Discharge


Clinical Impression: 


Intertrochanteric fracture


Qualifiers:


 Encounter type: initial encounter Fracture type: closed Fracture alignment: 

displaced Laterality: right Qualified Code(s): S72.141A - Displaced 

intertrochanteric fracture of right femur, initial encounter for closed fracture





Condition: Stable


Disposition: ADMITTED AS INPATIENT


Admitting Provider: Satish (Hospitalist)


Unit Admitted: Telemetry


Referrals: 


KRISTEL GIBBONS MD [Primary Care Provider] - Follow up as needed

## 2020-08-08 NOTE — RADIOLOGY REPORT (SQ)
EXAM DESCRIPTION:  CT CERVICAL SPINE WITHOUT



IMAGES COMPLETED DATE/TIME:  8/8/2020 9:56 am



REASON FOR STUDY:  fall on thinners



COMPARISON:  2/9/2019



TECHNIQUE:  Axial images acquired through the cervical spine without intravenous contrast.  Images re
viewed with lung, soft tissue and bone windows.  Reconstructed coronal and sagittal MPR images review
ed.  Images stored on PACS.

All CT scanners at this facility use dose modulation, iterative reconstruction, and/or weight based d
osing when appropriate to reduce radiation dose to as low as reasonably achievable (ALARA).

[facility]: Dose Right  [facility]: CareDose    [facility]: Dose Right    [facility]: Teradose 4D    [facility]: Smart [email or web address]



RADIATION DOSE:  CT Rad equipment meets quality standard of care and radiation dose reduction techniq
ues were employed. CTDIvol: 21.3 mGy. DLP: 382 mGy-cm. mGy.



LIMITATIONS:  None.



FINDINGS:  ALIGNMENT: Anatomic.

MINERALIZATION: Normal.

VERTEBRAL BODIES: No fractures or dislocation.  Congenital nonunion posterior arch of C1.  Mild spond
ylosis with small marginal osteophytes.

DISCS: Mild degenerative disc disease with loss of intervertebral disc height.  No significant disc b
ulge or spinal canal stenosis.

FACETS, LATERAL MASSES, POSTERIOR ELEMENTS: No fractures.  No dislocation.  No acute findings.

HARDWARE: None in the spine.

VISUALIZED RIBS: No fractures.

LUNG APICES AND SOFT TISSUES: No significant or acute findings.

OTHER: Enlarged heterogeneous right thyroid lobe stable in appearance from previous.



IMPRESSION:

1. No acute fracture or dislocation of the cervical spine.

2. Mild degenerative disc disease and spondylosis.

3. Enlarged right thyroid lobe, unchanged from prior.



TECHNICAL DOCUMENTATION:  JOB ID:  [id number]

Quality ID # 436: Final reports with documentation of one or more dose reduction techniques (e.g., Au
tomated exposure control, adjustment of the mA and/or kV according to patient size, use of iterative 
reconstruction technique)

 2011 [employer]- All Rights Reserved



Reading location - IP/workstation name: [id number]

## 2020-08-08 NOTE — XCELERA REPORT
[address] [address]

                               Tel: [phone]

                               Fax: [phone]



                      Transthoracic Echocardiogram Report

_______________________________________________________________________________



Name: BRUNA CORBETT

MRN: [id number]                           Age: 84 yrs

Gender: Female                            : 1935

Patient Status: Inpatient                 Patient Location: [facility]A

Account #: [id number]

Study Date: 2020 05:11 PM

Accession #: [id number]

_______________________________________________________________________________



Height: 64 in        Weight: 151 lb        BSA: 1.7 m2

_______________________________________________________________________________

Procedure: A two-dimensional transthoracic echocardiogram with color flow and

Doppler was performed. The study was technically difficult with many images

being suboptimal in quality.

Reason For Study: ATRIAL FIBRILLATION / PRE-OP



History: ATRIAL FIBRILLATION / PRE-OP.

Ordering Physician: KRISTEL GIBBONS



Performed By: Christin Dasilva

_______________________________________________________________________________



Interpretation Summary

The left ventricle is normal in size.

There is mild concentric left ventricular hypertrophy.

Left ventricular systolic function is normal.

LV EF is > than 65%

Doppler measurements suggest pseudonormalized left ventricular relaxation,

which is associated with grade II/IV or mild to moderate diastolic dysfunction

The left ventricular wall motion is normal.

There is no thrombus.

Probably no ASD ,VSD,or PFO.

The right ventricle is normal in size and function.

The right atrium is normal.

The left atrial size is normal.

There is no evidence of mitral valve prolapse.

There is no vegetation seen on the mitral valve.

There is no mitral valve stenosis.

There is no aortic valvular vegetation.

There is mild aortic stenosis

There is a peak gradient of 28 mm of Hg.

No hemodynamically significant valvular aortic stenosis.

There is no LVOT obstruction.

There is a trace to mild amount of aortic regurgitation

There is no tricuspid stenosis.

There is a trace amount of tricuspid regurgitation

There is mild pulmonary hypertension by echo

RVSP is 38 mm of Hg ,with RA mean of 10.

There is no pulmonic valvular stenosis.

There is no pulmonic valvular regurgitation.

The aortic root is normal size.

The inferior vena cava was not well visualized

There is no pericardial effusion.



MMode/2D Measurements & Calculations

RVDd: 2.4 cm  LVIDd: 3.2 cm    FS: 35.4 %            Ao root diam: 3.1 cm



IVSd: 1.2 cm  LVIDs: 2.1 cm    EDV(Teich): 40.2 ml   Ao root area: 7.4 cm2

              LVPWd: 0.99 cm   ESV(Teich): 13.6 ml

                               EF(Teich): 66.3 %



Doppler Measurements & Calculations

MV E max deonte:       Ao V2 max:         AI max deonte:          LV V1 max P.9 cm/sec         264.8 cm/sec       452.8 cm/sec         33.8 mmHg

MV A max deonte:       Ao max PG:         AI max P.1 mmHg LV V1 max:

128.5 cm/sec        28.0 mmHg          AI dec slope:        290.9 cm/sec

MV E/A: 0.37                           264.7 cm/sec2

                                       AI P1/2t: 500.9 msec



        _______________________________________________________________

PA V2 max:          PI end-d deonte:      TR max deonte:

120.6 cm/sec        103.5 cm/sec       265.2 cm/sec

PA max P.8 mmHg                    TR max P.3 mmHg



Left Ventricle

The left ventricle is normal in size. There is mild concentric left

ventricular hypertrophy. Left ventricular systolic function is normal. LV EF

is > than 65%. Doppler measurements suggest pseudonormalized left ventricular

relaxation, which is associated with grade II/IV or mild to moderate diastolic

dysfunction. The left ventricular wall motion is normal. There is no thrombus.

Probably no ASD ,VSD,or PFO.



Right Ventricle

The right ventricle is normal in size and function.





Atria

The right atrium is normal. The left atrial size is normal.



Mitral Valve

There is no evidence of mitral valve prolapse. There is no vegetation seen on

the mitral valve. There is no mitral valve stenosis. There is a trace amount

of mitral regurgitation.



Aortic Valve

There is no aortic valvular vegetation. There is mild aortic stenosis. There

is a peak gradient of 28 mm of Hg. No hemodynamically significant valvular

aortic stenosis. There is no LVOT obstruction. There is a trace to mild amount

of aortic regurgitation.



Tricuspid Valve

There is no tricuspid stenosis. There is a trace amount of tricuspid

regurgitation. There is mild pulmonary hypertension by echo. RVSP is 38 mm of

Hg ,with RA mean of 10.





Pulmonic Valve

There is no pulmonic valvular stenosis. There is no pulmonic valvular

regurgitation.



Great Vessels

The aortic root is normal size. The inferior vena cava was not well

visualized.



Effusions

There is no pericardial effusion.



_______________________________________________________________________________



_______________________________________________________________________________

Electronically signed by:      Kayla Atkins      on 2020 08:46 PM



CC: KRISTEL GIBBONS Lakshmi

## 2020-08-08 NOTE — RADIOLOGY REPORT (SQ)
EXAM DESCRIPTION:  HIP RIGHT AP/LATERAL



IMAGES COMPLETED DATE/TIME:  8/8/2020 12:08 pm



REASON FOR STUDY:  fx. AP with cross table lateral



COMPARISON:  None.



NUMBER OF VIEWS:  Two views.



TECHNIQUE:  AP pelvis and additional frog-leg view of the right hip.



LIMITATIONS:  None.



FINDINGS:  Bones are osteopenic.  Comminuted intertrochanteric fracture right femoral neck with valgu
s angulation.  Pelvis intact.



IMPRESSION:  Intertrochanteric fracture right femoral neck.



TECHNICAL DOCUMENTATION:  JOB ID:  [id number]

 2011 [employer]- All Rights Reserved



Reading location - IP/workstation name: [email or web address]

## 2020-08-08 NOTE — RADIOLOGY REPORT (SQ)
EXAM DESCRIPTION:  CHEST SINGLE VIEW



IMAGES COMPLETED DATE/TIME:  8/8/2020 12:08 pm



REASON FOR STUDY:  pre-op



COMPARISON:  5/19/2020



EXAM PARAMETERS:  NUMBER OF VIEWS: One view.

TECHNIQUE: Single frontal radiographic view of the chest acquired.

RADIATION DOSE: NA

LIMITATIONS: Portable technique.



FINDINGS:  LUNGS AND PLEURA: No opacities, masses or pneumothorax. No pleural effusion.

MEDIASTINUM AND HILAR STRUCTURES: Stable.

HEART AND VASCULAR STRUCTURES: Heart normal in size.  Normal vasculature.

BONES: No acute findings.

HARDWARE: None in the chest.

OTHER: No other significant finding.



IMPRESSION:  NO ACUTE RADIOGRAPHIC FINDING IN THE CHEST.



TECHNICAL DOCUMENTATION:  JOB ID:  [id number]

 2011 [employer]- All Rights Reserved



Reading location - IP/workstation name: [email or web address]

## 2020-08-09 LAB
ADD MANUAL DIFF: NO
BASOPHILS # BLD AUTO: 0 10^3/UL (ref 0–0.2)
BASOPHILS NFR BLD AUTO: 0.3 % (ref 0–2)
CK MB SERPL-MCNC: 1.58 NG/ML (ref ?–4.55)
EOSINOPHIL # BLD AUTO: 0 10^3/UL (ref 0–0.6)
EOSINOPHIL NFR BLD AUTO: 0 % (ref 0–6)
ERYTHROCYTE [DISTWIDTH] IN BLOOD BY AUTOMATED COUNT: 16.4 % (ref 11.5–14)
HCT VFR BLD CALC: 30 % (ref 36–47)
HGB BLD-MCNC: 9.7 G/DL (ref 12–15.5)
LYMPHOCYTES # BLD AUTO: 0.9 10^3/UL (ref 0.5–4.7)
LYMPHOCYTES NFR BLD AUTO: 9.5 % (ref 13–45)
MCH RBC QN AUTO: 23.4 PG (ref 27–33.4)
MCHC RBC AUTO-ENTMCNC: 32.4 G/DL (ref 32–36)
MCV RBC AUTO: 72 FL (ref 80–97)
MONOCYTES # BLD AUTO: 0.7 10^3/UL (ref 0.1–1.4)
MONOCYTES NFR BLD AUTO: 7.5 % (ref 3–13)
NEUTROPHILS # BLD AUTO: 8.3 10^3/UL (ref 1.7–8.2)
NEUTS SEG NFR BLD AUTO: 82.7 % (ref 42–78)
PLATELET # BLD: 207 10^3/UL (ref 150–450)
RBC # BLD AUTO: 4.15 10^6/UL (ref 3.72–5.28)
TOTAL CELLS COUNTED % (AUTO): 100 %
TROPONIN I SERPL-MCNC: 0.03 NG/ML
WBC # BLD AUTO: 10 10^3/UL (ref 4–10.5)

## 2020-08-09 PROCEDURE — 0QS606Z REPOSITION RIGHT UPPER FEMUR WITH INTRAMEDULLARY INTERNAL FIXATION DEVICE, OPEN APPROACH: ICD-10-PCS | Performed by: ORTHOPAEDIC SURGERY

## 2020-08-09 RX ADMIN — FUROSEMIDE SCH MG: 20 TABLET ORAL at 10:36

## 2020-08-09 RX ADMIN — FAMOTIDINE SCH MG: 10 INJECTION INTRAVENOUS at 21:35

## 2020-08-09 RX ADMIN — DOCUSATE SODIUM SCH: 100 CAPSULE, LIQUID FILLED ORAL at 17:05

## 2020-08-09 RX ADMIN — OXYCODONE HYDROCHLORIDE AND ACETAMINOPHEN SCH: 500 TABLET ORAL at 16:34

## 2020-08-09 RX ADMIN — LOSARTAN POTASSIUM SCH MG: 50 TABLET, FILM COATED ORAL at 10:36

## 2020-08-09 RX ADMIN — FUROSEMIDE SCH: 20 TABLET ORAL at 16:33

## 2020-08-09 RX ADMIN — OXYCODONE HYDROCHLORIDE AND ACETAMINOPHEN SCH MG: 500 TABLET ORAL at 10:36

## 2020-08-09 RX ADMIN — MULTIVITAMIN TABLET SCH: TABLET at 16:34

## 2020-08-09 RX ADMIN — OXYCODONE AND ACETAMINOPHEN PRN TAB: 5; 325 TABLET ORAL at 01:06

## 2020-08-09 RX ADMIN — DILTIAZEM HYDROCHLORIDE SCH MG: 180 CAPSULE, EXTENDED RELEASE ORAL at 21:35

## 2020-08-09 RX ADMIN — FAMOTIDINE SCH MG: 10 INJECTION INTRAVENOUS at 10:36

## 2020-08-09 RX ADMIN — DOCUSATE SODIUM SCH MG: 100 CAPSULE, LIQUID FILLED ORAL at 10:36

## 2020-08-09 RX ADMIN — LOSARTAN POTASSIUM SCH: 50 TABLET, FILM COATED ORAL at 16:33

## 2020-08-09 RX ADMIN — DOCUSATE SODIUM SCH: 100 CAPSULE, LIQUID FILLED ORAL at 16:33

## 2020-08-09 RX ADMIN — DILTIAZEM HYDROCHLORIDE SCH: 180 CAPSULE, EXTENDED RELEASE ORAL at 16:33

## 2020-08-09 RX ADMIN — DILTIAZEM HYDROCHLORIDE SCH MG: 180 CAPSULE, EXTENDED RELEASE ORAL at 10:36

## 2020-08-09 RX ADMIN — CEFAZOLIN SODIUM SCH MLS/HR: 2 SOLUTION INTRAVENOUS at 17:21

## 2020-08-09 RX ADMIN — MULTIVITAMIN TABLET SCH TAB: TABLET at 10:36

## 2020-08-09 NOTE — EKG REPORT
SEVERITY:- ABNORMAL ECG -

SINUS RHYTHM

LEFT VENTRICULAR HYPERTROPHY

:

Confirmed by: Chris Gray MD 09-Aug-2020 08:23:39

## 2020-08-09 NOTE — RADIOLOGY REPORT (SQ)
EXAM DESCRIPTION:  NO CHG FLUORO; HIP RIGHT AP/LATERAL



IMAGES COMPLETED DATE/TIME:  8/9/2020 9:31 am



REASON FOR STUDY:  RIGHT HIP NAILING



COMPARISON:  None.



FLUOROSCOPY TIME:  0.6 minutes

3 images saved to PACS.



TECHNIQUE:  Intra-operative images acquired during surgical procedure to evaluate progress.

NUMBER OF IMAGES: 3



LIMITATIONS:  None.



FINDINGS:  Hema and dynamic hip compression screw fixation of femoral neck fracture.  Alignment is bigg
tomic.



IMPRESSION:  IMAGE(S) OBTAINED DURING PROCEDURE.



COMMENT:  Quality :  Final reports for procedures using fluoroscopy that document radiation exp
osure indices, or exposure time and number of fluorographic images (if radiation exposure indices are
 not available)

Please consult full operative report of the attending physician for description of the procedure.



TECHNICAL DOCUMENTATION:  JOB ID:  [id number]

 2011 [employer]- All Rights Reserved



Reading location - IP/workstation name: [email or web address]

## 2020-08-09 NOTE — PDOC PROGRESS REPORT
Subjective


Progress Note for:: 08/09/20


Subjective:: 





Patient is all echocardiogram was stable


Orthopedic surgery taken to the operating rooms


According to nursing staff no other concerns





Reason For Visit: 


RT HIP FX








Physical Exam


Vital Signs: 


                                        











Temp Pulse Resp BP Pulse Ox


 


 97.4 F   99   17   132/86 H  97 


 


 08/09/20 07:44  08/09/20 07:44  08/09/20 07:44  08/09/20 07:44  08/09/20 07:44








                                 Intake & Output











 08/08/20 08/09/20 08/10/20





 06:59 06:59 06:59


 


Intake Total   500


 


Output Total   50


 


Balance   450


 


Weight  68.9 kg 














Results


Laboratory Results: 


                                        





                                 08/09/20 07:04 





                                 08/08/20 16:50 





                                        











  08/08/20 08/08/20 08/08/20





  10:18 10:18 10:18


 


WBC   


 


RBC   


 


Hgb   


 


Hct   


 


MCV   


 


MCH   


 


MCHC   


 


RDW   


 


Plt Count   


 


Seg Neutrophils %   


 


Sodium  137.6   Cancelled


 


Potassium  3.7   Cancelled


 


Chloride  109 H   Cancelled


 


Carbon Dioxide  22   Cancelled


 


Anion Gap  7   Cancelled


 


BUN  13   Cancelled


 


Creatinine  0.89   Cancelled


 


Est GFR ( Amer)  > 60   Cancelled


 


Est GFR (Non-Af Amer)    Cancelled


 


Glucose  126 H   Cancelled


 


Calcium  9.8   Cancelled


 


Magnesium   


 


TSH   2.62 














  08/08/20 08/09/20 08/09/20





  16:50 07:04 07:04


 


WBC   10.0 


 


RBC   4.15 


 


Hgb   9.7 L 


 


Hct   30.0 L 


 


MCV   72 L 


 


MCH   23.4 L 


 


MCHC   32.4 


 


RDW   16.4 H 


 


Plt Count   207 


 


Seg Neutrophils %   82.7 H 


 


Sodium  136.2 L  


 


Potassium  4.3  


 


Chloride  107  


 


Carbon Dioxide  22  


 


Anion Gap  7  


 


BUN  12  


 


Creatinine  0.75  


 


Est GFR ( Amer)  > 60  


 


Est GFR (Non-Af Amer)   


 


Glucose  107  


 


Calcium  9.8  


 


Magnesium    1.9


 


TSH   








                                        











  08/08/20 08/08/20 08/08/20





  10:18 10:18 19:30


 


Creatine Kinase  62   61


 


CK-MB (CK-2)   1.86 


 


Troponin I   0.020 


 


NT-Pro-B Natriuret Pep   














  08/08/20 08/09/20 08/09/20





  19:30 01:31 01:31


 


Creatine Kinase   69 


 


CK-MB (CK-2)  1.83   1.58


 


Troponin I  0.024   0.025


 


NT-Pro-B Natriuret Pep   














  08/09/20





  07:04


 


Creatine Kinase 


 


CK-MB (CK-2) 


 


Troponin I 


 


NT-Pro-B Natriuret Pep  466 H











Impressions: 


                                        





Cervical Spine CT  08/08/20 10:21


IMPRESSION:


1. No acute fracture or dislocation of the cervical spine.


2. Mild degenerative disc disease and spondylosis.


3. Enlarged right thyroid lobe, unchanged from prior.


 








Head CT  08/08/20 10:21


IMPRESSION:


1. No acute intracranial hemorrhage, mass, or evidence of acute territorial 

infarct.


2. Severe chronic small vessel ischemic change.


EVIDENCE OF ACUTE STROKE: NO.


 








Pelvis CT  08/08/20 10:21


IMPRESSION:  Acute intertrochanteric fracture right femur.  Mild displacement of

the greater and lesser trochanters.


 








Chest X-Ray  08/08/20 11:54


IMPRESSION:  NO ACUTE RADIOGRAPHIC FINDING IN THE CHEST.


 








Fluoroscopy  08/09/20 00:00


IMPRESSION:  IMAGE(S) OBTAINED DURING PROCEDURE.


 








Hip/Pelvis X-Ray  08/09/20 00:00


IMPRESSION:  IMAGE(S) OBTAINED DURING PROCEDURE.


 














Assessment & Plan





- Diagnosis


(1) Intertrochanteric fracture


Qualifiers: 


   Encounter type: initial encounter   Fracture type: closed   Fracture 

alignment: displaced   Laterality: right   Qualified Code(s): S72.141A - 

Displaced intertrochanteric fracture of right femur, initial encounter for 

closed fracture   


Is this a current diagnosis for this admission?: Yes   





(2) Chronic atrial fibrillation


Is this a current diagnosis for this admission?: Yes   





(3) Dementia


Qualifiers: 


   Dementia type: unspecified type 


Is this a current diagnosis for this admission?: Yes   





(4) HTN (hypertension)


Qualifiers: 


   Hypertension type: essential hypertension   Qualified Code(s): I10 - 

Essential (primary) hypertension   


Is this a current diagnosis for this admission?: Yes   





(5) Hyperthyroidism, subclinical


Is this a current diagnosis for this admission?: Yes   





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


Is this a current diagnosis for this admission?: Yes   





(7) Vitamin D deficiency


Is this a current diagnosis for this admission?: Yes   





- Time


Time Spent with patient: 15-24 minutes


Level of Care: TELE


Medications reviewed and adjusted accordingly: Yes


Anticipated discharge: Other


Anticipated DC Timeframe: Other





- Plan Summary


Plan Summary: 





The patient is stable from the surgical standpoint will restart the Eliquis 

tomorrow


Follow-up with the cardiology


We will repeat the blood work in the morning and EKG

## 2020-08-09 NOTE — RADIOLOGY REPORT (SQ)
EXAM DESCRIPTION:  NO CHG FLUORO; HIP RIGHT AP/LATERAL



IMAGES COMPLETED DATE/TIME:  8/9/2020 9:31 am



REASON FOR STUDY:  RIGHT HIP NAILING



COMPARISON:  None.



FLUOROSCOPY TIME:  0.6 minutes

3 images saved to PACS.



TECHNIQUE:  Intra-operative images acquired during surgical procedure to evaluate progress.

NUMBER OF IMAGES: 3



LIMITATIONS:  None.



FINDINGS:  Hema and dynamic hip compression screw fixation of femoral neck fracture.  Alignment is bigg
tomic.



IMPRESSION:  IMAGE(S) OBTAINED DURING PROCEDURE.



COMMENT:  Quality :  Final reports for procedures using fluoroscopy that document radiation exp
osure indices, or exposure time and number of fluorographic images (if radiation exposure indices are
 not available)

Please consult full operative report of the attending physician for description of the procedure.



TECHNICAL DOCUMENTATION:  JOB ID:  [id number]

 2011 [employer]- All Rights Reserved



Reading location - IP/workstation name: [email or web address]

## 2020-08-09 NOTE — OPERATIVE REPORT
Operative Report


DATE OF SURGERY: 08/09/20


PREOPERATIVE DIAGNOSIS: Right hip displaced intertrochanteric fracture


POSTOPERATIVE DIAGNOSIS: Same


OPERATION: Cephalo-medullary gamma nail internal fixation right 

intertrochanteric fracture


SURGEON: YOVANI CROSS


ANESTHESIA: GA


COMPLICATIONS: 





None


ESTIMATED BLOOD LOSS: 100 cc


INTRAOPERATIVE FINDINGS: Same


PROCEDURE: 





Patient is for procedure: The patient is an 84-year-old woman who sustained a 

displaced intertrochanteric fracture of the right hip as a result of a low-

energy fall at home.





Description of procedure: Following the induction of a general anesthetic and 

administration of 2 g of Ancef, the patient was positioned supine on the 

fracture table.  All bony prominences were padded.  Reduction of the fracture 

was performed through the fracture table under image intensification.  Anatomic 

restoration of fracture alignment was obtained.  The right lower extremity was 

then sterilely prepped with ChloraPrep and draped in standard fashion.  Incision

was made proximal to the trochanter.  A sharp incision was performed through 

skin with electrocautery through the subcutaneous tissues.  A small fascial 

opening was made.  Guidewire was placed centrally through the trochanter and 

then overreamed.  Gamma nail was placed under image intensification.  Guidewire 

was placed through the gamma nail jig centrally into the femoral head.  The 

guidewire was then overreamed and 100 mm lag screw was placed.  The lag screw 

was locked dynamically.  A separate incision was made distally and the gamma 

nail was locked in static mode.  Image intensification was brought in which 

confirmed anatomic alignment of the fracture and correct placement of implants. 

The wounds were copiously irrigated.  The fascia was reapproximated with 2-0 

Vicryl suture.  The subcutaneous tissue was closed with 2-0 Vicryl suture.  The 

skin was reapproximated with a 3-0 Monocryl suture.  Dermabond Prineo was used 

on the skin layer.  A sterile dressing was then applied.  The patient tolerated 

the procedure well without complication and was brought recovery in stable 

condition.

## 2020-08-09 NOTE — PDOC CONSULTATION
Consultation


Consult Date: 20


Attending physician:: PREET WORLEY


Provider Consulted: YOVANI CROSS


Consult reason:: Right hip displaced intertrochanteric fracture





History of Present Illness


Admission Date/PCP: 


  20 12:29





  KRISTEL GIBBONS





Patient complains of: Right hip pain and inability to stand or ambulate


History of Present Illness: 


BRUNA CORBETT is a 84 year old female with atrial fibrillation on Eliquis.  

The patient has moderate dementia.  She lives at home with her family.  She 

sustained a low-energy fall while attempting to use the toilet resulting in a 

displaced fracture of the right hip.  She has been admitted to the medical 

service and optimized for internal fixation of the hip.








Past Medical History


Cardiac Medical History: Reports: Congestive Heart Failure, Hypertension - 

medicated


   Denies: Atrial Fibrillation, Coronary Artery Disease, Myocardial Infarction, 

Hyperlipidema, Peripheral Vascular Disease, Heart Murmur


Pulmonary Medical History: 


   Denies: Asthma, Bronchitis, Chronic Obstructive Pulmonary Disease (COPD), 

Pneumonia, Tuberculosis


Neurological Medical History: 


   Denies: Seizures


GI Medical History: 


   Denies: Hepatitis, Hiatal Hernia


Psychiatric Medical History: Reports: Dementia


   Denies: Depression


Hematology: 


   Denies: Anemia, Sickle Cell Disease





Past Surgical History


Past Surgical History: Reports: Tubal Ligation


   Denies: Amputation, Appendectomy,  Section, Cholecystectomy, Coronary

Artery Bypass Graft, Gastric Bypass Surgery, Herniorrhaphy, Hysterectomy, 

Mastectomy, Pacemaker, Tonsillectomy





Social History


Lives with: Family


Smoking Status: Never Smoker


Electronic Cigarette use?: No


Frequency of Alcohol Use: None


Hx Recreational Drug Use: No


Drugs: None


Hx Prescription Drug Abuse: No





Family History


Family History: Reviewed & Not Pertinent


Parental Family History Reviewed: Yes


Children Family History Reviewed: Yes


Sibling(s) Family History Reviewed.: No





Medication/Allergy


Home Medications: 








Omeprazole 20 mg PO DAILY 14 


Multivitamin [Tab-A-Jese (Multiple Vitamin) Tablet] 1 tab PO DAILY 19 


Apixaban [Eliquis 2.5 mg Tablet] 2.5 mg PO BID #60 tablet 19 


Ascorbic Acid [Vitamin C 500 mg Tablet] 500 mg PO DAILY 20 


Diltiazem HCl [Cardizem Cd 180 mg Capsule] 180 mg PO BID 20 


Ferrous Sulfate [Feosol 325 mg Tablet] 325 mg PO DAILY 20 


Furosemide [Lasix 20 mg Tablet] 20 mg PO DAILY 20 


Losartan Potassium [Cozaar 50 mg Tablet] 50 mg PO DAILY 20 


Megestrol Acetate [Megace 20 mg Tablet] 40 mg PO DAILY 20 








Allergies/Adverse Reactions: 


                                        





lisinopril Adverse Reaction (Mild, Verified 20 20:40)


   cough











Review of Systems


ROS unobtainable: Other - As per HPI





Physical Exam


Vital Signs: 


                                        











Temp Pulse Resp BP Pulse Ox


 


 97.4 F   99   17   132/86 H  97 


 


 20 07:44  20 07:44  20 07:44  20 07:44  20 07:44








                                 Intake & Output











 08/08/20 08/09/20 08/10/20





 06:59 06:59 06:59


 


Weight  68.9 kg 











General appearance: PRESENT: no acute distress, well-developed, well-nourished


Head exam: PRESENT: atraumatic, normocephalic


Neck exam: ABSENT: carotid bruit, JVD, lymphadenopathy, thyromegaly


Respiratory exam: PRESENT: clear to auscultation giacomo.  ABSENT: rales, rhonchi, 

wheezes


Cardiovascular exam: PRESENT: RRR


Pulses: PRESENT: +2 pedal pulses bilateral


GI/Abdominal exam: PRESENT: normal bowel sounds, soft.  ABSENT: distended, 

guarding, mass, organolmegaly, rebound, tenderness


Rectal exam: PRESENT: deferred


Musculoskeletal exam: PRESENT: other - The right leg is shortened and externally

rotated.  The patient is able to dorsiflex and plantarflex the ankle.  Sensation

is intact to touch.  2+ DP and PT pulses.


Neurological exam: PRESENT: alert





Results


Laboratory Results: 


                                        





                                 20 16:50 





                                        











  20





  10:18 10:18 10:18


 


WBC  7.0  


 


RBC  4.30  


 


Hgb  10.0 L  


 


Hct  31.4 L  


 


MCV  73 L  


 


MCH  23.2 L  


 


MCHC  31.8 L  


 


RDW  16.7 H  


 


Plt Count  208  


 


Seg Neutrophils %  52.6  


 


Sodium   137.6 


 


Potassium   3.7 


 


Chloride   109 H 


 


Carbon Dioxide   22 


 


Anion Gap   7 


 


BUN   13 


 


Creatinine   0.89 


 


Est GFR ( Amer)   > 60 


 


Est GFR (Non-Af Amer)   


 


Glucose   126 H 


 


Calcium   9.8 


 


TSH    2.62














  20





  10:18 16:50


 


WBC  


 


RBC  


 


Hgb  


 


Hct  


 


MCV  


 


MCH  


 


MCHC  


 


RDW  


 


Plt Count  


 


Seg Neutrophils %  


 


Sodium  Cancelled  136.2 L


 


Potassium  Cancelled  4.3


 


Chloride  Cancelled  107


 


Carbon Dioxide  Cancelled  22


 


Anion Gap  Cancelled  7


 


BUN  Cancelled  12


 


Creatinine  Cancelled  0.75


 


Est GFR ( Amer)  Cancelled  > 60


 


Est GFR (Non-Af Amer)  Cancelled 


 


Glucose  Cancelled  107


 


Calcium  Cancelled  9.8


 


TSH  








                                        











  20





  10:18 10:18 19:30


 


Creatine Kinase  62   61


 


CK-MB (CK-2)   1.86 


 


Troponin I   0.020 














  20





  19:30 01:31 01:31


 


Creatine Kinase   69 


 


CK-MB (CK-2)  1.83   1.58


 


Troponin I  0.024   0.025











Impressions: 


                                        





Cervical Spine CT  20 10:21


IMPRESSION:


1. No acute fracture or dislocation of the cervical spine.


2. Mild degenerative disc disease and spondylosis.


3. Enlarged right thyroid lobe, unchanged from prior.


 








Head CT  20 10:21


IMPRESSION:


1. No acute intracranial hemorrhage, mass, or evidence of acute territorial 

infarct.


2. Severe chronic small vessel ischemic change.


EVIDENCE OF ACUTE STROKE: NO.


 








Pelvis CT  20 10:21


IMPRESSION:  Acute intertrochanteric fracture right femur.  Mild displacement of

the greater and lesser trochanters.


 








Hip/Pelvis X-Ray  20 11:50


IMPRESSION:  Intertrochanteric fracture right femoral neck.


 








Chest X-Ray  20 11:54


IMPRESSION:  NO ACUTE RADIOGRAPHIC FINDING IN THE CHEST.


 














Assessment & Plan





- Diagnosis


(1) Intertrochanteric fracture


Qualifiers: 


   Encounter type: initial encounter   Fracture type: closed   Fracture 

alignment: displaced   Laterality: right   Qualified Code(s): S72.141A - 

Displaced intertrochanteric fracture of right femur, initial encounter for 

closed fracture   


Is this a current diagnosis for this admission?: Yes   





- Time


Time Spent: 30 to 50 Minutes


Anticipated discharge: SNF


Anticipated DC Timeframe: within 48 hours





- Plan Summary


Plan Summary: 





The patient has sustained a displaced unstable intertrochanteric fracture of the

right hip.  I have recommended internal fixation with a cephalo-medullary gamma 

nail.  Risks, benefits, and alternatives were discussed with the patient's 

daughter.  Risks include the risk of death with anesthesia, the risk of 

infection, the risk of injury to nerves and vessels, the risk of nonunion and 

malunion, the possible need for additional surgery, and the possible need for 

blood transfusion.  An opportunity for questions was provided to the patient's 

daughter.  All questions were answered to her satisfaction.  The patient's 

daughter expressed understanding and wishes to proceed with surgery.

## 2020-08-10 LAB
ADD MANUAL DIFF: NO
ANION GAP SERPL CALC-SCNC: 7 MMOL/L (ref 5–19)
BASOPHILS # BLD AUTO: 0 10^3/UL (ref 0–0.2)
BASOPHILS NFR BLD AUTO: 0.3 % (ref 0–2)
BUN SERPL-MCNC: 17 MG/DL (ref 7–20)
CALCIUM: 9.3 MG/DL (ref 8.4–10.2)
CHLORIDE SERPL-SCNC: 107 MMOL/L (ref 98–107)
CO2 SERPL-SCNC: 24 MMOL/L (ref 22–30)
EOSINOPHIL # BLD AUTO: 0 10^3/UL (ref 0–0.6)
EOSINOPHIL NFR BLD AUTO: 0 % (ref 0–6)
ERYTHROCYTE [DISTWIDTH] IN BLOOD BY AUTOMATED COUNT: 16 % (ref 11.5–14)
GLUCOSE SERPL-MCNC: 139 MG/DL (ref 75–110)
HCT VFR BLD CALC: 26.4 % (ref 36–47)
HGB BLD-MCNC: 8.5 G/DL (ref 12–15.5)
LYMPHOCYTES # BLD AUTO: 1.1 10^3/UL (ref 0.5–4.7)
LYMPHOCYTES NFR BLD AUTO: 8.7 % (ref 13–45)
MCH RBC QN AUTO: 23.2 PG (ref 27–33.4)
MCHC RBC AUTO-ENTMCNC: 32.1 G/DL (ref 32–36)
MCV RBC AUTO: 72 FL (ref 80–97)
MONOCYTES # BLD AUTO: 1.3 10^3/UL (ref 0.1–1.4)
MONOCYTES NFR BLD AUTO: 10.2 % (ref 3–13)
NEUTROPHILS # BLD AUTO: 10.5 10^3/UL (ref 1.7–8.2)
NEUTS SEG NFR BLD AUTO: 80.8 % (ref 42–78)
PLATELET # BLD: 168 10^3/UL (ref 150–450)
POTASSIUM SERPL-SCNC: 4.3 MMOL/L (ref 3.6–5)
RBC # BLD AUTO: 3.64 10^6/UL (ref 3.72–5.28)
TOTAL CELLS COUNTED % (AUTO): 100 %
WBC # BLD AUTO: 13.1 10^3/UL (ref 4–10.5)

## 2020-08-10 RX ADMIN — CEFAZOLIN SODIUM SCH MLS/HR: 2 SOLUTION INTRAVENOUS at 09:43

## 2020-08-10 RX ADMIN — MULTIVITAMIN TABLET SCH: TABLET at 10:12

## 2020-08-10 RX ADMIN — DILTIAZEM HYDROCHLORIDE SCH: 180 CAPSULE, EXTENDED RELEASE ORAL at 21:22

## 2020-08-10 RX ADMIN — ACETAMINOPHEN PRN MG: 325 TABLET ORAL at 21:32

## 2020-08-10 RX ADMIN — APIXABAN SCH: 2.5 TABLET, FILM COATED ORAL at 20:38

## 2020-08-10 RX ADMIN — DILTIAZEM HYDROCHLORIDE SCH MG: 180 CAPSULE, EXTENDED RELEASE ORAL at 10:02

## 2020-08-10 RX ADMIN — CEFAZOLIN SODIUM SCH MLS/HR: 2 SOLUTION INTRAVENOUS at 02:00

## 2020-08-10 RX ADMIN — FUROSEMIDE SCH: 20 TABLET ORAL at 10:12

## 2020-08-10 RX ADMIN — OXYCODONE HYDROCHLORIDE AND ACETAMINOPHEN SCH: 500 TABLET ORAL at 17:55

## 2020-08-10 RX ADMIN — APIXABAN SCH: 2.5 TABLET, FILM COATED ORAL at 10:12

## 2020-08-10 RX ADMIN — DOCUSATE SODIUM SCH: 100 CAPSULE, LIQUID FILLED ORAL at 10:11

## 2020-08-10 RX ADMIN — FAMOTIDINE SCH MG: 10 INJECTION INTRAVENOUS at 21:26

## 2020-08-10 RX ADMIN — LOSARTAN POTASSIUM SCH MG: 50 TABLET, FILM COATED ORAL at 10:03

## 2020-08-10 RX ADMIN — DOCUSATE SODIUM SCH: 100 CAPSULE, LIQUID FILLED ORAL at 20:38

## 2020-08-10 RX ADMIN — FAMOTIDINE SCH MG: 10 INJECTION INTRAVENOUS at 09:43

## 2020-08-10 NOTE — PDOC PROGRESS REPORT
Subjective


Progress Note for:: 08/10/20


Subjective:: 





Patient resting comfortably.  Reports decreased pain since surgery.


Reason For Visit: 


RT HIP FX


POD # 1 s/p Gamma nail for right intertrochanteric fracture





Physical Exam


Vital Signs: 


                                        











Temp Pulse Resp BP Pulse Ox


 


 98.0 F   102 H  17   128/70 H  92 


 


 08/10/20 06:29  08/10/20 06:29  08/10/20 06:29  08/10/20 06:29  08/10/20 06:29








                                 Intake & Output











 08/09/20 08/10/20 08/11/20





 06:59 06:59 06:59


 


Intake Total  660 


 


Output Total  50 


 


Balance  610 


 


Weight 68.9 kg 75.9 kg 











General appearance: PRESENT: no acute distress, well-developed, well-nourished


Head exam: PRESENT: atraumatic, normocephalic


Pulses: PRESENT: +2 pedal pulses bilateral


Musculoskeletal exam: PRESENT: other - The surgical dressings are intact.  There

is no drainage.  Patient is able to DF and PF the foot.  Sensation intact to 

touch.





Results


Laboratory Results: 


                                        





                                 08/10/20 04:08 





                                 08/10/20 04:08 





                                        











  08/10/20 08/10/20





  04:08 04:08


 


WBC  13.1 H 


 


RBC  3.64 L 


 


Hgb  8.5 L 


 


Hct  26.4 L 


 


MCV  72 L 


 


MCH  23.2 L 


 


MCHC  32.1 


 


RDW  16.0 H 


 


Plt Count  168 


 


Seg Neutrophils %  80.8 H 


 


Sodium   137.6


 


Potassium   4.3


 


Chloride   107


 


Carbon Dioxide   24


 


Anion Gap   7


 


BUN   17


 


Creatinine   1.12


 


Est GFR (African Amer)   56 L


 


Glucose   139 H


 


Calcium   9.3


 


Magnesium   2.0








                                        











  08/08/20 08/08/20 08/08/20





  10:18 10:18 19:30


 


Creatine Kinase  62   61


 


CK-MB (CK-2)   1.86 


 


Troponin I   0.020 


 


NT-Pro-B Natriuret Pep   














  08/08/20 08/09/20 08/09/20





  19:30 01:31 01:31


 


Creatine Kinase   69 


 


CK-MB (CK-2)  1.83   1.58


 


Troponin I  0.024   0.025


 


NT-Pro-B Natriuret Pep   














  08/09/20 08/10/20





  07:04 04:08


 


Creatine Kinase  


 


CK-MB (CK-2)  


 


Troponin I  


 


NT-Pro-B Natriuret Pep  466 H  1320 H











Impressions: 


                                        





Cervical Spine CT  08/08/20 10:21


IMPRESSION:


1. No acute fracture or dislocation of the cervical spine.


2. Mild degenerative disc disease and spondylosis.


3. Enlarged right thyroid lobe, unchanged from prior.


 








Head CT  08/08/20 10:21


IMPRESSION:


1. No acute intracranial hemorrhage, mass, or evidence of acute territorial 

infarct.


2. Severe chronic small vessel ischemic change.


EVIDENCE OF ACUTE STROKE: NO.


 








Pelvis CT  08/08/20 10:21


IMPRESSION:  Acute intertrochanteric fracture right femur.  Mild displacement of

the greater and lesser trochanters.


 








Chest X-Ray  08/08/20 11:54


IMPRESSION:  NO ACUTE RADIOGRAPHIC FINDING IN THE CHEST.


 








Fluoroscopy  08/09/20 00:00


IMPRESSION:  IMAGE(S) OBTAINED DURING PROCEDURE.


 








Hip/Pelvis X-Ray  08/09/20 00:00


IMPRESSION:  IMAGE(S) OBTAINED DURING PROCEDURE.


 














Assessment & Plan





- Diagnosis


(1) Intertrochanteric fracture


Qualifiers: 


   Encounter type: initial encounter   Fracture type: closed   Fracture 

alignment: displaced   Laterality: right   Qualified Code(s): S72.141A - 

Displaced intertrochanteric fracture of right femur, initial encounter for 

closed fracture   


Is this a current diagnosis for this admission?: Yes   





- Time


Critical Time spent with patient: 25-34 minutes


Anticipated Discharge Disposition: Skilled Nursing Facility


Anticipated Discharge Timeframe: within 48 hours





- Plan Summary


Plan Summary: 





POD # 1 s/p Gamma Nail for right intertrochanteric fracture


1. Ancef for 24 hours 


2. Eliquis/ SCD for DVT prophylaxis


3. PT- WBAT with assist device





Patient is stable for discharge from orthopedic standpoint.  Discharge to SNF or

home when stable from medical standpoint.

## 2020-08-10 NOTE — PDOC PROGRESS REPORT
Subjective


Progress Note for:: 08/10/20


Subjective:: 





Patient was admitted for fall at home with subsequent development of right hip 

fracture. She was taken to surgery for ORIF right hip fracture post operatively 

day # 1 and she is doing fine presently as per daughter at bedside although her 

p.o intake has been a challenge. No reported chest pain or difficulty with 

breathing. No reported fever or chills. No nausea, vomiting, or abdominal pain.


Reason For Visit: 


RT HIP FX








Physical Exam


Vital Signs: 


                                        











Temp Pulse Resp BP Pulse Ox


 


 98.0 F   102 H  17   128/70 H  92 


 


 08/10/20 06:29  08/10/20 06:29  08/10/20 06:29  08/10/20 06:29  08/10/20 06:29








                                 Intake & Output











 08/09/20 08/10/20 08/11/20





 06:59 06:59 06:59


 


Intake Total  660 


 


Output Total  50 


 


Balance  610 


 


Weight 68.9 kg 75.9 kg 











General appearance: PRESENT: no acute distress


Head exam: PRESENT: atraumatic, normocephalic


Eye exam: PRESENT: conjunctiva pink.  ABSENT: scleral icterus


Mouth exam: PRESENT: moist


Respiratory exam: PRESENT: clear to auscultation giacomo, decreased breath sounds - 

at lung bases


Cardiovascular exam: PRESENT: irregular rhythm, +S1, +S2.  ABSENT: diastolic 

murmur, rubs, systolic murmur


Vascular exam: ABSENT: pallor


GI/Abdominal exam: PRESENT: normal bowel sounds, soft.  ABSENT: organolmegaly, 

tenderness


Extremities exam: PRESENT: pedal edema


Musculoskeletal exam: PRESENT: deformity - related to multiple joints 

involvement with arthritis


Neurological exam: PRESENT: alert, awake, oriented to person, oriented to place


Skin exam: PRESENT: dry, warm, other - right hip surgical site dressing is 

satisfactory.





Results


Laboratory Results: 


                                        





                                 08/10/20 04:08 





                                 08/10/20 04:08 





                                        











  08/09/20 08/10/20 08/10/20





  07:04 04:08 04:08


 


WBC   13.1 H 


 


RBC   3.64 L 


 


Hgb   8.5 L 


 


Hct   26.4 L 


 


MCV   72 L 


 


MCH   23.2 L 


 


MCHC   32.1 


 


RDW   16.0 H 


 


Plt Count   168 


 


Seg Neutrophils %   80.8 H 


 


Sodium    137.6


 


Potassium    4.3


 


Chloride    107


 


Carbon Dioxide    24


 


Anion Gap    7


 


BUN    17


 


Creatinine    1.12


 


Est GFR ( Amer)    56 L


 


Glucose    139 H


 


Calcium    9.3


 


Magnesium  1.9   2.0








                                        











  08/08/20 08/08/20 08/08/20





  10:18 10:18 19:30


 


Creatine Kinase  62   61


 


CK-MB (CK-2)   1.86 


 


Troponin I   0.020 


 


NT-Pro-B Natriuret Pep   














  08/08/20 08/09/20 08/09/20





  19:30 01:31 01:31


 


Creatine Kinase   69 


 


CK-MB (CK-2)  1.83   1.58


 


Troponin I  0.024   0.025


 


NT-Pro-B Natriuret Pep   














  08/09/20 08/10/20





  07:04 04:08


 


Creatine Kinase  


 


CK-MB (CK-2)  


 


Troponin I  


 


NT-Pro-B Natriuret Pep  466 H  1320 H











Impressions: 


                                        





Cervical Spine CT  08/08/20 10:21


IMPRESSION:


1. No acute fracture or dislocation of the cervical spine.


2. Mild degenerative disc disease and spondylosis.


3. Enlarged right thyroid lobe, unchanged from prior.


 








Head CT  08/08/20 10:21


IMPRESSION:


1. No acute intracranial hemorrhage, mass, or evidence of acute territorial 

infarct.


2. Severe chronic small vessel ischemic change.


EVIDENCE OF ACUTE STROKE: NO.


 








Pelvis CT  08/08/20 10:21


IMPRESSION:  Acute intertrochanteric fracture right femur.  Mild displacement of

the greater and lesser trochanters.


 








Chest X-Ray  08/08/20 11:54


IMPRESSION:  NO ACUTE RADIOGRAPHIC FINDING IN THE CHEST.


 








Fluoroscopy  08/09/20 00:00


IMPRESSION:  IMAGE(S) OBTAINED DURING PROCEDURE.


 








Hip/Pelvis X-Ray  08/09/20 00:00


IMPRESSION:  IMAGE(S) OBTAINED DURING PROCEDURE.


 














Assessment & Plan





- Diagnosis


(1) Fall at home


Qualifiers: 


   Encounter type: initial encounter   Qualified Code(s): W19.XXXA - Unspecified

fall, initial encounter; Y92.009 - Unspecified place in unspecified non-

institutional (private) residence as the place of occurrence of the external 

cause   


Is this a current diagnosis for this admission?: Yes   


Plan: 


Continue current medication management. PT evaluation as per orthopedic team 

recommendation.








(2) Intertrochanteric fracture


Qualifiers: 


   Encounter type: initial encounter   Fracture type: closed   Fracture al

ignment: displaced   Laterality: right   Qualified Code(s): S72.141A - Displaced

intertrochanteric fracture of right femur, initial encounter for closed fracture

  


Is this a current diagnosis for this admission?: Yes   


Plan: 


Day # 1 post ORIF intervention. PT evaluation as per orthopedic team 

recommendation.








(3) Chronic atrial fibrillation


Is this a current diagnosis for this admission?: Yes   


Plan: 


Maintain on rate control medication and oral anticoagulation therapy.








(4) HTN (hypertension)


Qualifiers: 


   Hypertension type: essential hypertension   Qualified Code(s): I10 - 

Essential (primary) hypertension   


Is this a current diagnosis for this admission?: Yes   


Plan: 


Continue current medication management.








(5) GERD (gastroesophageal reflux disease)


Qualifiers: 


   Esophagitis presence: without esophagitis   Qualified Code(s): K21.9 - 

Gastro-esophageal reflux disease without esophagitis   


Is this a current diagnosis for this admission?: Yes   


Plan: 


Continue current medication management.








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


Is this a current diagnosis for this admission?: Yes   


Plan: 


Continue current medication management.








(7) Postmenopausal osteoporosis


Is this a current diagnosis for this admission?: Yes   


Plan: 


Continue current medication management.








(8) Vitamin D deficiency


Is this a current diagnosis for this admission?: Yes   


Plan: 


Continue current medication management.








(9) SDAT (senile dementia of Alzheimer's type)


Is this a current diagnosis for this admission?: Yes   


Plan: 


Continue current medication management.








- Time


Time Spent with patient: 25-34 minutes


Level of Care: MEDICAL


Medications reviewed and adjusted accordingly: Yes


Anticipated discharge: SNF


Anticipated DC Timeframe: when bed available





- Inpatient Certification


Based on my medical assessment, after consideration of the patient's 

comorbidities, presenting symptoms, or acuity I expect that the services needed 

warrant INPATIENT care.: Yes


I certify that my determination is in accordance with my understanding of 

Medicare's requirements for reasonable and necessary INPATIENT services [42 CFR 

412.3e].: Yes


Medical Necessity: Significant Comorbidiites Make Outpatient Treatment Too 

Risky, Need Close Monitoring Due to Risk of Patient Decompensation, Need For IV 

Fluids, Need For Continuous Telemetry Monitoring, Need for Pain Control, Need 

for Surgery, Risk of Complication if Not Cared For in Hospital, Risk of 

Diagnosis Which Will Require Inpatient Eval/Care/Monitoring


Post Hospital Care: D/C or Transfer Summary





- Plan Summary


Plan Summary: 





Continue current medication management. Follow up on SNF placement for 

rehabilitation.

## 2020-08-10 NOTE — CDI QUERY
<KRISTIE BAY - Last Filed: 08/10/20 13:42>





CDI Query


CDI Review: 





We are seeking further clarification of documentation to reflect the severity of

illness of your patient.





Per H&P:   History of atrial fibrillation on Eliquis





Fall   Right hip displaced intertrochanteric fracture





OPERATION: Cephalo-medullary gamma nail internal fixation right 

intertrochanteric fracture





Labs: 





 H/H on admission:  10.0 / 31.4  post-op:  8.5 / 26.4





Based on your medical judgement, can you further clarify in the Progress Notes:





   Acute blood loss anemia


   Post-op blood loss anemia


   Dilutional anemia


   Unable to determine


   Other





Thank you for your consideration.





ESTEFANY Holt RN


Clinical 


Physician Advisor


[phone]


[email or web address]








<KRISTEL GIBBONS - Last Filed: 08/10/20 15:34>





CDI Query


Agree with Query: Yes - unable to determine

## 2020-08-11 LAB
ADD MANUAL DIFF: NO
ANION GAP SERPL CALC-SCNC: 5 MMOL/L (ref 5–19)
BASOPHILS # BLD AUTO: 0 10^3/UL (ref 0–0.2)
BASOPHILS NFR BLD AUTO: 0.2 % (ref 0–2)
BUN SERPL-MCNC: 24 MG/DL (ref 7–20)
CALCIUM: 9.3 MG/DL (ref 8.4–10.2)
CHLORIDE SERPL-SCNC: 110 MMOL/L (ref 98–107)
CO2 SERPL-SCNC: 24 MMOL/L (ref 22–30)
EOSINOPHIL # BLD AUTO: 0 10^3/UL (ref 0–0.6)
EOSINOPHIL NFR BLD AUTO: 0.1 % (ref 0–6)
ERYTHROCYTE [DISTWIDTH] IN BLOOD BY AUTOMATED COUNT: 15.7 % (ref 11.5–14)
GLUCOSE SERPL-MCNC: 102 MG/DL (ref 75–110)
HCT VFR BLD CALC: 24.3 % (ref 36–47)
HGB BLD-MCNC: 7.6 G/DL (ref 12–15.5)
LYMPHOCYTES # BLD AUTO: 1.3 10^3/UL (ref 0.5–4.7)
LYMPHOCYTES NFR BLD AUTO: 10.2 % (ref 13–45)
MCH RBC QN AUTO: 23 PG (ref 27–33.4)
MCHC RBC AUTO-ENTMCNC: 31.4 G/DL (ref 32–36)
MCV RBC AUTO: 73 FL (ref 80–97)
MONOCYTES # BLD AUTO: 1.3 10^3/UL (ref 0.1–1.4)
MONOCYTES NFR BLD AUTO: 10.2 % (ref 3–13)
NEUTROPHILS # BLD AUTO: 10.2 10^3/UL (ref 1.7–8.2)
NEUTS SEG NFR BLD AUTO: 79.3 % (ref 42–78)
PLATELET # BLD: 142 10^3/UL (ref 150–450)
POTASSIUM SERPL-SCNC: 4.4 MMOL/L (ref 3.6–5)
RBC # BLD AUTO: 3.32 10^6/UL (ref 3.72–5.28)
TOTAL CELLS COUNTED % (AUTO): 100 %
WBC # BLD AUTO: 12.8 10^3/UL (ref 4–10.5)

## 2020-08-11 PROCEDURE — 30233N1 TRANSFUSION OF NONAUTOLOGOUS RED BLOOD CELLS INTO PERIPHERAL VEIN, PERCUTANEOUS APPROACH: ICD-10-PCS | Performed by: INTERNAL MEDICINE

## 2020-08-11 RX ADMIN — MULTIVITAMIN TABLET SCH: TABLET at 09:43

## 2020-08-11 RX ADMIN — DILTIAZEM HYDROCHLORIDE SCH MG: 180 CAPSULE, EXTENDED RELEASE ORAL at 22:27

## 2020-08-11 RX ADMIN — LOSARTAN POTASSIUM SCH: 50 TABLET, FILM COATED ORAL at 17:56

## 2020-08-11 RX ADMIN — APIXABAN SCH MG: 2.5 TABLET, FILM COATED ORAL at 17:58

## 2020-08-11 RX ADMIN — DOCUSATE SODIUM SCH MG: 100 CAPSULE, LIQUID FILLED ORAL at 09:33

## 2020-08-11 RX ADMIN — APIXABAN SCH MG: 2.5 TABLET, FILM COATED ORAL at 09:33

## 2020-08-11 RX ADMIN — FAMOTIDINE SCH MG: 10 INJECTION INTRAVENOUS at 09:31

## 2020-08-11 RX ADMIN — DILTIAZEM HYDROCHLORIDE SCH MG: 180 CAPSULE, EXTENDED RELEASE ORAL at 09:32

## 2020-08-11 RX ADMIN — DOCUSATE SODIUM SCH MG: 100 CAPSULE, LIQUID FILLED ORAL at 17:58

## 2020-08-11 RX ADMIN — FAMOTIDINE SCH MG: 10 INJECTION INTRAVENOUS at 22:28

## 2020-08-11 RX ADMIN — FUROSEMIDE SCH MG: 20 TABLET ORAL at 09:33

## 2020-08-11 RX ADMIN — OXYCODONE HYDROCHLORIDE AND ACETAMINOPHEN SCH: 500 TABLET ORAL at 09:44

## 2020-08-11 NOTE — PDOC PROGRESS REPORT
Subjective


Progress Note for:: 08/11/20


Subjective:: 


 No reported chest pain or difficulty with breathing. No reported fever or 

chills. Oral food intake remain a challenge. No nausea, vomiting, or abdominal 

pain.


Reason For Visit: 


RT HIP FX








Physical Exam


Vital Signs: 


                                        











Temp Pulse Resp BP Pulse Ox


 


 98.9 F   89   21 H  117/57 L  100 


 


 08/11/20 11:41  08/11/20 11:41  08/11/20 11:41  08/11/20 11:41  08/11/20 11:41








                                 Intake & Output











 08/10/20 08/11/20 08/12/20





 06:59 06:59 06:59


 


Intake Total 660 811 240


 


Output Total 50  


 


Balance 610 811 240


 


Weight 75.9 kg 75.9 kg 











Physical Exam: 


General appearance: PRESENT: no acute distress


Head exam: PRESENT: atraumatic, normocephalic


Eye exam: PRESENT: conjunctiva pink.  ABSENT: pallor, sclera icterus


Mouth exam: PRESENT: moist


Respiratory exam: PRESENT: clear to auscultation giacomo, decreased breath sounds - 

at lung bases


Cardiovascular exam: PRESENT: irregular rhythm, +S1, +S2.  ABSENT: diastolic 

murmur, rubs, systolic murmur


GI/Abdominal exam: PRESENT: normal bowel sounds, soft.  ABSENT: organomegaly, 

tenderness


Extremities exam: PRESENT: pedal edema


Musculoskeletal exam: PRESENT: deformity - related to multiple joints involvemen

t with arthritis


Neurological exam: PRESENT: alert, awake, appropriate and cooperative with 

examination


Skin exam: PRESENT: dry, warm, other - right hip surgical site dressing is 

satisfactory.





Results


Laboratory Results: 


                                        





                                 08/11/20 04:34 





                                 08/11/20 04:34 





                                        











  08/11/20 08/11/20





  04:34 04:34


 


WBC  12.8 H 


 


RBC  3.32 L 


 


Hgb  7.6 L 


 


Hct  24.3 L 


 


MCV  73 L 


 


MCH  23.0 L 


 


MCHC  31.4 L 


 


RDW  15.7 H 


 


Plt Count  142 L 


 


Seg Neutrophils %  79.3 H 


 


Sodium   139.0


 


Potassium   4.4


 


Chloride   110 H


 


Carbon Dioxide   24


 


Anion Gap   5


 


BUN   24 H


 


Creatinine   1.07


 


Est GFR (African Amer)   59 L


 


Glucose   102


 


Calcium   9.3


 


Magnesium   2.3








                                        











  08/08/20 08/08/20 08/08/20





  10:18 10:18 19:30


 


Creatine Kinase  62   61


 


CK-MB (CK-2)   1.86 


 


Troponin I   0.020 


 


NT-Pro-B Natriuret Pep   














  08/08/20 08/09/20 08/09/20





  19:30 01:31 01:31


 


Creatine Kinase   69 


 


CK-MB (CK-2)  1.83   1.58


 


Troponin I  0.024   0.025


 


NT-Pro-B Natriuret Pep   














  08/09/20 08/10/20 08/11/20





  07:04 04:08 04:34


 


Creatine Kinase   


 


CK-MB (CK-2)   


 


Troponin I   


 


NT-Pro-B Natriuret Pep  466 H  1320 H  4540 H











Impressions: 


                                        





Cervical Spine CT  08/08/20 10:21


IMPRESSION:


1. No acute fracture or dislocation of the cervical spine.


2. Mild degenerative disc disease and spondylosis.


3. Enlarged right thyroid lobe, unchanged from prior.


 








Head CT  08/08/20 10:21


IMPRESSION:


1. No acute intracranial hemorrhage, mass, or evidence of acute territorial 

infarct.


2. Severe chronic small vessel ischemic change.


EVIDENCE OF ACUTE STROKE: NO.


 








Pelvis CT  08/08/20 10:21


IMPRESSION:  Acute intertrochanteric fracture right femur.  Mild displacement of

the greater and lesser trochanters.


 








Chest X-Ray  08/08/20 11:54


IMPRESSION:  NO ACUTE RADIOGRAPHIC FINDING IN THE CHEST.


 








Fluoroscopy  08/09/20 00:00


IMPRESSION:  IMAGE(S) OBTAINED DURING PROCEDURE.


 








Hip/Pelvis X-Ray  08/09/20 00:00


IMPRESSION:  IMAGE(S) OBTAINED DURING PROCEDURE.


 














Assessment & Plan





- Diagnosis


(1) Fall at home


Qualifiers: 


   Encounter type: initial encounter   Qualified Code(s): W19.XXXA - Unspecified

fall, initial encounter; Y92.009 - Unspecified place in unspecified non-

institutional (private) residence as the place of occurrence of the external 

cause   


Is this a current diagnosis for this admission?: Yes   





(2) Intertrochanteric fracture


Qualifiers: 


   Encounter type: initial encounter   Fracture type: closed   Fracture 

alignment: displaced   Laterality: right   Qualified Code(s): S72.141A - 

Displaced intertrochanteric fracture of right femur, initial encounter for 

closed fracture   


Is this a current diagnosis for this admission?: Yes   





(3) Chronic atrial fibrillation


Is this a current diagnosis for this admission?: Yes   





(4) HTN (hypertension)


Qualifiers: 


   Hypertension type: essential hypertension   Qualified Code(s): I10 - 

Essential (primary) hypertension   


Is this a current diagnosis for this admission?: Yes   





(5) GERD (gastroesophageal reflux disease)


Qualifiers: 


   Esophagitis presence: without esophagitis   Qualified Code(s): K21.9 - 

Gastro-esophageal reflux disease without esophagitis   


Is this a current diagnosis for this admission?: Yes   





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


Is this a current diagnosis for this admission?: Yes   





(7) Postmenopausal osteoporosis


Is this a current diagnosis for this admission?: Yes   





(8) Vitamin D deficiency


Is this a current diagnosis for this admission?: Yes   





(9) SDAT (senile dementia of Alzheimer's type)


Is this a current diagnosis for this admission?: Yes   





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


Is this a current diagnosis for this admission?: Yes   


Plan: 


May be due to combined effect of blood loss at surgery and hemodilution. She 

will receive 2 units PRBC with IV Lasix 20 mg between infusion. Obtain CBC with 

diff in am.








- Time


Time Spent with patient: 25-34 minutes


Level of Care: TELE


Medications reviewed and adjusted accordingly: Yes


Anticipated discharge: SNF


Anticipated DC Timeframe: when bed available





- Inpatient Certification


Based on my medical assessment, after consideration of the patient's 

comorbidities, presenting symptoms, or acuity I expect that the services needed 

warrant INPATIENT care.: Yes


I certify that my determination is in accordance with my understanding of 

Medicare's requirements for reasonable and necessary INPATIENT services [42 CFR 

412.3e].: Yes


Medical Necessity: Significant Comorbidiites Make Outpatient Treatment Too 

Risky, Need Close Monitoring Due to Risk of Patient Decompensation, Need For IV 

Fluids, Need For Continuous Telemetry Monitoring, Need for Surgery, Risk of 

Complication if Not Cared For in Hospital, Risk of Diagnosis Which Will Require 

Inpatient Eval/Care/Monitoring


Post Hospital Care: D/C or Transfer Summary





- Plan Summary


Plan Summary: 





See attending physician orders for details. Follow up with PT and discharge 

planner regarding SNF placement.

## 2020-08-12 LAB
ADD MANUAL DIFF: NO
ANION GAP SERPL CALC-SCNC: 4 MMOL/L (ref 5–19)
BASOPHILS # BLD AUTO: 0 10^3/UL (ref 0–0.2)
BASOPHILS NFR BLD AUTO: 0.3 % (ref 0–2)
BUN SERPL-MCNC: 25 MG/DL (ref 7–20)
CALCIUM: 8.7 MG/DL (ref 8.4–10.2)
CHLORIDE SERPL-SCNC: 109 MMOL/L (ref 98–107)
CO2 SERPL-SCNC: 24 MMOL/L (ref 22–30)
EOSINOPHIL # BLD AUTO: 0 10^3/UL (ref 0–0.6)
EOSINOPHIL NFR BLD AUTO: 0.4 % (ref 0–6)
ERYTHROCYTE [DISTWIDTH] IN BLOOD BY AUTOMATED COUNT: 16.4 % (ref 11.5–14)
GLUCOSE SERPL-MCNC: 94 MG/DL (ref 75–110)
HCT VFR BLD CALC: 32.3 % (ref 36–47)
HGB BLD-MCNC: 10.2 G/DL (ref 12–15.5)
LYMPHOCYTES # BLD AUTO: 1.3 10^3/UL (ref 0.5–4.7)
LYMPHOCYTES NFR BLD AUTO: 11.1 % (ref 13–45)
MCH RBC QN AUTO: 24.2 PG (ref 27–33.4)
MCHC RBC AUTO-ENTMCNC: 31.8 G/DL (ref 32–36)
MCV RBC AUTO: 76 FL (ref 80–97)
MONOCYTES # BLD AUTO: 1.3 10^3/UL (ref 0.1–1.4)
MONOCYTES NFR BLD AUTO: 11 % (ref 3–13)
NEUTROPHILS # BLD AUTO: 8.9 10^3/UL (ref 1.7–8.2)
NEUTS SEG NFR BLD AUTO: 77.2 % (ref 42–78)
PLATELET # BLD: 148 10^3/UL (ref 150–450)
POTASSIUM SERPL-SCNC: 4.6 MMOL/L (ref 3.6–5)
RBC # BLD AUTO: 4.24 10^6/UL (ref 3.72–5.28)
TOTAL CELLS COUNTED % (AUTO): 100 %
WBC # BLD AUTO: 11.5 10^3/UL (ref 4–10.5)

## 2020-08-12 RX ADMIN — OXYCODONE HYDROCHLORIDE AND ACETAMINOPHEN SCH: 500 TABLET ORAL at 10:16

## 2020-08-12 RX ADMIN — FUROSEMIDE SCH MG: 20 TABLET ORAL at 10:03

## 2020-08-12 RX ADMIN — SODIUM CHLORIDE PRN MLS/HR: 9 INJECTION, SOLUTION INTRAVENOUS at 10:05

## 2020-08-12 RX ADMIN — FAMOTIDINE SCH MG: 10 INJECTION INTRAVENOUS at 10:03

## 2020-08-12 RX ADMIN — LOSARTAN POTASSIUM SCH MG: 50 TABLET, FILM COATED ORAL at 10:03

## 2020-08-12 RX ADMIN — MULTIVITAMIN TABLET SCH: TABLET at 10:16

## 2020-08-12 RX ADMIN — DILTIAZEM HYDROCHLORIDE SCH MG: 180 CAPSULE, EXTENDED RELEASE ORAL at 10:03

## 2020-08-12 RX ADMIN — DOCUSATE SODIUM SCH MG: 100 CAPSULE, LIQUID FILLED ORAL at 10:03

## 2020-08-12 RX ADMIN — APIXABAN SCH MG: 2.5 TABLET, FILM COATED ORAL at 17:44

## 2020-08-12 RX ADMIN — ACETAMINOPHEN PRN MG: 325 TABLET ORAL at 17:46

## 2020-08-12 RX ADMIN — FAMOTIDINE SCH MG: 10 INJECTION INTRAVENOUS at 21:09

## 2020-08-12 RX ADMIN — DILTIAZEM HYDROCHLORIDE SCH MG: 180 CAPSULE, EXTENDED RELEASE ORAL at 21:09

## 2020-08-12 RX ADMIN — APIXABAN SCH MG: 2.5 TABLET, FILM COATED ORAL at 10:03

## 2020-08-12 RX ADMIN — ACETAMINOPHEN PRN MG: 325 TABLET ORAL at 01:48

## 2020-08-12 RX ADMIN — DOCUSATE SODIUM SCH MG: 100 CAPSULE, LIQUID FILLED ORAL at 17:44

## 2020-08-12 NOTE — PDOC PROGRESS REPORT
Subjective


Progress Note for:: 08/12/20


Subjective:: 


 No reported chest pain or difficulty with breathing. No reported fever or 

chills. No nausea, vomiting, or abdominal pain. Her oral intake is improving. 

Patient continue to participate in physical therapy.


Reason For Visit: 


RT HIP FX








Physical Exam


Vital Signs: 


                                        











Temp Pulse Resp BP Pulse Ox


 


 97.2 F   87   19   123/68   98 


 


 08/12/20 11:44  08/12/20 14:00  08/12/20 11:44  08/12/20 11:44  08/12/20 11:44








                                 Intake & Output











 08/11/20 08/12/20 08/13/20





 06:59 06:59 06:59


 


Intake Total 811 1220 650


 


Balance 811 1220 650


 


Weight 75.9 kg 76 kg 











Physical Exam: 





General appearance: PRESENT: no acute distress


Head exam: PRESENT: atraumatic, normocephalic


Eye exam: PRESENT: conjunctiva pink.  ABSENT: pallor, sclera icterus


Mouth exam: PRESENT: moist


Respiratory exam: PRESENT: clear to auscultation giacomo, decreased breath sounds - 

at lung bases


Cardiovascular exam: PRESENT: irregular rhythm, +S1, +S2.  ABSENT: diastolic 

murmur, rubs, systolic murmur


GI/Abdominal exam: PRESENT: normal bowel sounds, soft.  ABSENT: organomegaly, 

tenderness


Extremities exam: PRESENT: pedal edema


Musculoskeletal exam: PRESENT: deformity - related to multiple joints 

involvement with arthritis


Neurological exam: PRESENT: alert, awake, appropriate and cooperative with 

examination


Skin exam: PRESENT: dry, warm, other - right hip surgical site dressing is 

satisfactory.





Results


Laboratory Results: 


                                        





                                 08/12/20 05:46 





                                 08/12/20 05:46 





                                        











  08/11/20 08/12/20 08/12/20





  18:40 05:46 05:46


 


WBC    11.5 H


 


RBC    4.24


 


Hgb    10.2 L D


 


Hct    32.3 L


 


MCV    76 L


 


MCH    24.2 L


 


MCHC    31.8 L


 


RDW    16.4 H


 


Plt Count    148 L


 


Seg Neutrophils %    77.2


 


Sodium   137.0 


 


Potassium   4.6 


 


Chloride   109 H 


 


Carbon Dioxide   24 


 


Anion Gap   4 L 


 


BUN   25 H 


 


Creatinine   0.96 


 


Est GFR ( Amer)   > 60 


 


Glucose   94 


 


Calcium   8.7 


 


Blood Type  A POSITIVE  


 


Antibody Screen  NEGATIVE  








                                        











  08/08/20 08/08/20 08/08/20





  10:18 10:18 19:30


 


Creatine Kinase  62   61


 


CK-MB (CK-2)   1.86 


 


Troponin I   0.020 


 


NT-Pro-B Natriuret Pep   














  08/08/20 08/09/20 08/09/20





  19:30 01:31 01:31


 


Creatine Kinase   69 


 


CK-MB (CK-2)  1.83   1.58


 


Troponin I  0.024   0.025


 


NT-Pro-B Natriuret Pep   














  08/09/20 08/10/20 08/11/20





  07:04 04:08 04:34


 


Creatine Kinase   


 


CK-MB (CK-2)   


 


Troponin I   


 


NT-Pro-B Natriuret Pep  466 H  1320 H  4540 H











Impressions: 


                                        





Cervical Spine CT  08/08/20 10:21


IMPRESSION:


1. No acute fracture or dislocation of the cervical spine.


2. Mild degenerative disc disease and spondylosis.


3. Enlarged right thyroid lobe, unchanged from prior.


 








Head CT  08/08/20 10:21


IMPRESSION:


1. No acute intracranial hemorrhage, mass, or evidence of acute territorial 

infarct.


2. Severe chronic small vessel ischemic change.


EVIDENCE OF ACUTE STROKE: NO.


 








Pelvis CT  08/08/20 10:21


IMPRESSION:  Acute intertrochanteric fracture right femur.  Mild displacement of

the greater and lesser trochanters.


 








Chest X-Ray  08/08/20 11:54


IMPRESSION:  NO ACUTE RADIOGRAPHIC FINDING IN THE CHEST.


 








Fluoroscopy  08/09/20 00:00


IMPRESSION:  IMAGE(S) OBTAINED DURING PROCEDURE.


 








Hip/Pelvis X-Ray  08/09/20 00:00


IMPRESSION:  IMAGE(S) OBTAINED DURING PROCEDURE.


 














Assessment & Plan





- Diagnosis


(1) Fall at home


Qualifiers: 


   Encounter type: initial encounter   Qualified Code(s): W19.XXXA - Unspecified

fall, initial encounter; Y92.009 - Unspecified place in unspecified non-

institutional (private) residence as the place of occurrence of the external 

cause   


Is this a current diagnosis for this admission?: Yes   





(2) Intertrochanteric fracture


Qualifiers: 


   Encounter type: initial encounter   Fracture type: closed   Fracture 

alignment: displaced   Laterality: right   Qualified Code(s): S72.141A - Di

splaced intertrochanteric fracture of right femur, initial encounter for closed 

fracture   


Is this a current diagnosis for this admission?: Yes   





(3) Chronic atrial fibrillation


Is this a current diagnosis for this admission?: Yes   





(4) HTN (hypertension)


Qualifiers: 


   Hypertension type: essential hypertension   Qualified Code(s): I10 - 

Essential (primary) hypertension   


Is this a current diagnosis for this admission?: Yes   





(5) GERD (gastroesophageal reflux disease)


Qualifiers: 


   Esophagitis presence: without esophagitis   Qualified Code(s): K21.9 - 

Gastro-esophageal reflux disease without esophagitis   


Is this a current diagnosis for this admission?: Yes   





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


Is this a current diagnosis for this admission?: Yes   





(7) Postmenopausal osteoporosis


Is this a current diagnosis for this admission?: Yes   





(8) Vitamin D deficiency


Is this a current diagnosis for this admission?: Yes   





(9) SDAT (senile dementia of Alzheimer's type)


Is this a current diagnosis for this admission?: Yes   





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


Is this a current diagnosis for this admission?: Yes   





- Time


Time Spent with patient: 25-34 minutes


Level of Care: TELE


Medications reviewed and adjusted accordingly: Yes


Anticipated discharge: SNF


Anticipated DC Timeframe: when bed available





- Inpatient Certification


Based on my medical assessment, after consideration of the patient's 

comorbidities, presenting symptoms, or acuity I expect that the services needed 

warrant INPATIENT care.: Yes


I certify that my determination is in accordance with my understanding of 

Medicare's requirements for reasonable and necessary INPATIENT services [42 CFR 

412.3e].: Yes


Medical Necessity: Significant Comorbidiites Make Outpatient Treatment Too 

Risky, Need Close Monitoring Due to Risk of Patient Decompensation, Need For IV 

Fluids, Need For Continuous Telemetry Monitoring, Risk of Complication if Not 

Cared For in Hospital, Risk of Diagnosis Which Will Require Inpatient Bridget

l/Care/Monitoring


Post Hospital Care: D/C or Transfer Summary





- Plan Summary


Plan Summary: 





Continue current medication and rehabilitative therapy. Daughter is agreeable to

SNF short term rehabilitation at this time.

## 2020-08-13 RX ADMIN — APIXABAN SCH MG: 2.5 TABLET, FILM COATED ORAL at 10:17

## 2020-08-13 RX ADMIN — ACETAMINOPHEN PRN MG: 325 TABLET ORAL at 06:16

## 2020-08-13 RX ADMIN — OXYCODONE HYDROCHLORIDE AND ACETAMINOPHEN SCH: 500 TABLET ORAL at 10:17

## 2020-08-13 RX ADMIN — DOCUSATE SODIUM SCH: 100 CAPSULE, LIQUID FILLED ORAL at 17:03

## 2020-08-13 RX ADMIN — ACETAMINOPHEN PRN MG: 325 TABLET ORAL at 22:34

## 2020-08-13 RX ADMIN — ACETAMINOPHEN PRN MG: 325 TABLET ORAL at 16:30

## 2020-08-13 RX ADMIN — DILTIAZEM HYDROCHLORIDE SCH MG: 180 CAPSULE, EXTENDED RELEASE ORAL at 10:16

## 2020-08-13 RX ADMIN — MULTIVITAMIN TABLET SCH: TABLET at 10:17

## 2020-08-13 RX ADMIN — DOCUSATE SODIUM SCH MG: 100 CAPSULE, LIQUID FILLED ORAL at 10:17

## 2020-08-13 RX ADMIN — FAMOTIDINE SCH MG: 10 INJECTION INTRAVENOUS at 10:17

## 2020-08-13 RX ADMIN — APIXABAN SCH MG: 2.5 TABLET, FILM COATED ORAL at 17:03

## 2020-08-13 RX ADMIN — FUROSEMIDE SCH MG: 20 TABLET ORAL at 10:16

## 2020-08-13 RX ADMIN — DILTIAZEM HYDROCHLORIDE SCH MG: 180 CAPSULE, EXTENDED RELEASE ORAL at 21:38

## 2020-08-13 RX ADMIN — FAMOTIDINE SCH MG: 20 TABLET, FILM COATED ORAL at 21:38

## 2020-08-13 RX ADMIN — LOSARTAN POTASSIUM SCH MG: 50 TABLET, FILM COATED ORAL at 10:17

## 2020-08-13 RX ADMIN — SODIUM CHLORIDE PRN MLS/HR: 9 INJECTION, SOLUTION INTRAVENOUS at 06:19

## 2020-08-13 NOTE — PDOC PROGRESS REPORT
Subjective


Progress Note for:: 08/13/20


Subjective:: 


 No reported chest pain or difficulty with breathing. No reported fever or 

chills. No nausea, vomiting, or abdominal pain. Awaiting SNF placement for short

term rehabilitation.


Reason For Visit: 


RT HIP FX








Physical Exam


Vital Signs: 


                                        











Temp Pulse Resp BP Pulse Ox


 


 97.9 F   89   17   127/65 H  100 


 


 08/12/20 23:20  08/13/20 02:00  08/12/20 23:20  08/12/20 23:20  08/12/20 23:20








                                 Intake & Output











 08/12/20 08/13/20 08/14/20





 06:59 06:59 06:59


 


Intake Total 1220 2000 


 


Balance 1220 2000 


 


Weight 76 kg 76 kg 











Physical Exam: 





General appearance: PRESENT: no acute distress


Head exam: PRESENT: atraumatic, normocephalic


Eye exam: PRESENT: conjunctiva pink.  ABSENT: pallor, sclera icterus


Mouth exam: PRESENT: moist


Respiratory exam: PRESENT: clear to auscultation giacomo, decreased breath sounds - 

at lung bases


Cardiovascular exam: PRESENT: irregular rhythm, +S1, +S2.  ABSENT: diastolic 

murmur, rubs, systolic murmur


GI/Abdominal exam: PRESENT: normal bowel sounds, soft.  ABSENT: organomegaly, 

tenderness


Extremities exam: PRESENT: pedal edema


Musculoskeletal exam: PRESENT: deformity - related to multiple joints 

involvement with arthritis


Neurological exam: PRESENT: alert, awake, appropriate and cooperative with 

examination


Skin exam: PRESENT: dry, warm, other - right hip surgical site dressing is 

satisfactory.





Results


Laboratory Results: 


                                        





                                 08/12/20 05:46 





                                 08/12/20 05:46 





                                        











  08/12/20





  05:46


 


WBC  11.5 H


 


RBC  4.24


 


Hgb  10.2 L D


 


Hct  32.3 L


 


MCV  76 L


 


MCH  24.2 L


 


MCHC  31.8 L


 


RDW  16.4 H


 


Plt Count  148 L


 


Seg Neutrophils %  77.2








                                        











  08/08/20 08/08/20 08/08/20





  10:18 10:18 19:30


 


Creatine Kinase  62   61


 


CK-MB (CK-2)   1.86 


 


Troponin I   0.020 


 


NT-Pro-B Natriuret Pep   














  08/08/20 08/09/20 08/09/20





  19:30 01:31 01:31


 


Creatine Kinase   69 


 


CK-MB (CK-2)  1.83   1.58


 


Troponin I  0.024   0.025


 


NT-Pro-B Natriuret Pep   














  08/09/20 08/10/20 08/11/20





  07:04 04:08 04:34


 


Creatine Kinase   


 


CK-MB (CK-2)   


 


Troponin I   


 


NT-Pro-B Natriuret Pep  466 H  1320 H  4540 H











Impressions: 


                                        





Cervical Spine CT  08/08/20 10:21


IMPRESSION:


1. No acute fracture or dislocation of the cervical spine.


2. Mild degenerative disc disease and spondylosis.


3. Enlarged right thyroid lobe, unchanged from prior.


 








Head CT  08/08/20 10:21


IMPRESSION:


1. No acute intracranial hemorrhage, mass, or evidence of acute territorial 

infarct.


2. Severe chronic small vessel ischemic change.


EVIDENCE OF ACUTE STROKE: NO.


 








Pelvis CT  08/08/20 10:21


IMPRESSION:  Acute intertrochanteric fracture right femur.  Mild displacement of

the greater and lesser trochanters.


 








Chest X-Ray  08/08/20 11:54


IMPRESSION:  NO ACUTE RADIOGRAPHIC FINDING IN THE CHEST.


 








Fluoroscopy  08/09/20 00:00


IMPRESSION:  IMAGE(S) OBTAINED DURING PROCEDURE.


 








Hip/Pelvis X-Ray  08/09/20 00:00


IMPRESSION:  IMAGE(S) OBTAINED DURING PROCEDURE.


 














Assessment & Plan





- Diagnosis


(1) Fall at home


Qualifiers: 


   Encounter type: initial encounter   Qualified Code(s): W19.XXXA - Unspecified

fall, initial encounter; Y92.009 - Unspecified place in unspecified non-

institutional (private) residence as the place of occurrence of the external 

cause   


Is this a current diagnosis for this admission?: Yes   





(2) Intertrochanteric fracture


Qualifiers: 


   Encounter type: initial encounter   Fracture type: closed   Fracture 

alignment: displaced   Laterality: right   Qualified Code(s): S72.141A - 

Displaced intertrochanteric fracture of right femur, initial encounter for 

closed fracture   


Is this a current diagnosis for this admission?: Yes   





(3) Chronic atrial fibrillation


Is this a current diagnosis for this admission?: Yes   





(4) HTN (hypertension)


Qualifiers: 


   Hypertension type: essential hypertension   Qualified Code(s): I10 - 

Essential (primary) hypertension   


Is this a current diagnosis for this admission?: Yes   





(5) GERD (gastroesophageal reflux disease)


Qualifiers: 


   Esophagitis presence: without esophagitis   Qualified Code(s): K21.9 - 

Gastro-esophageal reflux disease without esophagitis   


Is this a current diagnosis for this admission?: Yes   





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


Is this a current diagnosis for this admission?: Yes   





(7) Postmenopausal osteoporosis


Is this a current diagnosis for this admission?: Yes   





(8) Vitamin D deficiency


Is this a current diagnosis for this admission?: Yes   





(9) SDAT (senile dementia of Alzheimer's type)


Is this a current diagnosis for this admission?: Yes   





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


Is this a current diagnosis for this admission?: Yes   





- Time


Time Spent with patient: 25-34 minutes


Level of Care: TELE


Medications reviewed and adjusted accordingly: Yes


Anticipated discharge: SNF


Anticipated DC Timeframe: when bed available





- Inpatient Certification


Based on my medical assessment, after consideration of the patient's 

comorbidities, presenting symptoms, or acuity I expect that the services needed 

warrant INPATIENT care.: Yes


I certify that my determination is in accordance with my understanding of 

Medicare's requirements for reasonable and necessary INPATIENT services [42 CFR 

412.3e].: Yes


Medical Necessity: Significant Comorbidiites Make Outpatient Treatment Too 

Risky, Need Close Monitoring Due to Risk of Patient Decompensation, Need For IV 

Fluids, Need For Continuous Telemetry Monitoring, Risk of Complication if Not 

Cared For in Hospital, Risk of Diagnosis Which Will Require Inpatient 

Eval/Care/Monitoring


Post Hospital Care: D/C or Transfer Summary





- Plan Summary


Plan Summary: 





Continue current medication management. Follow up with discharge planner 

regarding SNF transfer.

## 2020-08-14 VITALS — SYSTOLIC BLOOD PRESSURE: 141 MMHG | DIASTOLIC BLOOD PRESSURE: 77 MMHG

## 2020-08-14 RX ADMIN — DILTIAZEM HYDROCHLORIDE SCH MG: 180 CAPSULE, EXTENDED RELEASE ORAL at 10:35

## 2020-08-14 RX ADMIN — ACETAMINOPHEN PRN MG: 325 TABLET ORAL at 10:36

## 2020-08-14 RX ADMIN — DOCUSATE SODIUM SCH MG: 100 CAPSULE, LIQUID FILLED ORAL at 10:36

## 2020-08-14 RX ADMIN — LOSARTAN POTASSIUM SCH MG: 50 TABLET, FILM COATED ORAL at 10:36

## 2020-08-14 RX ADMIN — MULTIVITAMIN TABLET SCH TAB: TABLET at 10:36

## 2020-08-14 RX ADMIN — FAMOTIDINE SCH MG: 20 TABLET, FILM COATED ORAL at 10:36

## 2020-08-14 RX ADMIN — OXYCODONE HYDROCHLORIDE AND ACETAMINOPHEN SCH MG: 500 TABLET ORAL at 10:36

## 2020-08-14 RX ADMIN — APIXABAN SCH MG: 2.5 TABLET, FILM COATED ORAL at 10:36

## 2020-08-14 RX ADMIN — FUROSEMIDE SCH MG: 20 TABLET ORAL at 10:37

## 2020-08-14 NOTE — PDOC TRANSFER SUMMARY
Impression





- Admit/DC Date/PCP


Admission Date/Primary Care Provider: 


  08/08/20 12:29





  KRISTEL GIBBONS





Discharge Date: 08/14/20





- Discharge Diagnosis


(1) Fall at home


Is this a current diagnosis for this admission?: Yes   





(2) Intertrochanteric fracture


Is this a current diagnosis for this admission?: Yes   





(3) Chronic atrial fibrillation


Is this a current diagnosis for this admission?: Yes   





(4) HTN (hypertension)


Is this a current diagnosis for this admission?: Yes   





(5) GERD (gastroesophageal reflux disease)


Is this a current diagnosis for this admission?: Yes   





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


Is this a current diagnosis for this admission?: Yes   





(7) Postmenopausal osteoporosis


Is this a current diagnosis for this admission?: Yes   





(8) Vitamin D deficiency


Is this a current diagnosis for this admission?: Yes   





(9) SDAT (senile dementia of Alzheimer's type)


Is this a current diagnosis for this admission?: Yes   





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


Is this a current diagnosis for this admission?: Yes   





- Assessment


Summary: 


Patient was admitted following presentation to the ED follow a fall at home with

right hip pain and difficulty with ambulation. her ED evaluation revealed right 

hip fracture. She was taken to the operating suite on 08/09/2020 for ORIF. Post 

operatively, she was transfused 2 units of PRBC during this hospitalization due 

to anaemia of multifactorial reasons. She participated in physical therapy 

session. She will be transferred to [facility] for short term rehabilitation. 

She will benefit from home health agency service including visiting nurse, 

physical therapy and personal aide services upon discharge from the SNF. She 

will follow up with orthopedic surgeon and myself as instructed upon discharge.





- Additional Information


Resuscitation Status: Full Code


Discharge Diet: As Tolerated, Cardiac


Discharge Activity: Activity As Tolerated, Slowly Increase Activity, Supervised 

Activity


Referrals: 


[facility] [Outside]


YOVANI CROSS MD [ACTIVE STAFF] - 09/01/20 11:30 am


KRISTEL GIBBONS MD [Primary Care Provider] -  (call office at [phone] for

appointment before discharge from SNF.)


Prescriptions: 


Oxycodone HCl/Acetaminophen [Percocet 5-325 mg Tablet] 1 tab PO Q6HP PRN #10 

tablet


 PRN Reason: 


Home Medications: 








Omeprazole 20 mg PO DAILY 02/07/14 


Multivitamin [Tab-A-Jese (Multiple Vitamin) Tablet] 1 tab PO DAILY 03/08/19 


Apixaban [Eliquis 2.5 mg Tablet] 2.5 mg PO BID #60 tablet 03/12/19 


Ascorbic Acid [Vitamin C 500 mg Tablet] 500 mg PO DAILY 08/08/20 


Diltiazem HCl [Cardizem Cd 180 mg Capsule] 180 mg PO BID 08/08/20 


Ferrous Sulfate [Feosol 325 mg Tablet] 325 mg PO DAILY 08/08/20 


Furosemide [Lasix 20 mg Tablet] 20 mg PO DAILY 08/08/20 


Losartan Potassium [Cozaar 50 mg Tablet] 50 mg PO DAILY 08/08/20 


Megestrol Acetate [Megace 20 mg Tablet] 40 mg PO DAILY 08/08/20 


Oxycodone HCl/Acetaminophen [Percocet 5-325 mg Tablet] 1 tab PO Q6HP PRN #10 

tablet 08/14/20 











History of Present Illiness


History of Present Illness: 


BRUNA CORBETT is a 84 year old female


This is a 84-year-old female of Dr. Gibbons with a history of the dementia 

atrial fibrillation on Eliquis hypertension hypothyroidism and vitamin D 

deficiency she chronically slow mental state per family who presented to the ER 

via EMS after a chief complaint of the fall.


EMS reports the family states that she falls all the times.


Today's been EMS called because she fall which between 2 objects in the home and

the family could not get to her or help her up


EMS was able to extract her.


They report this he was ambulatory on the skin and complains of right thigh pain


Patient and family do not report hitting her head or any loss of consciousness


Patient initial work-up in the emergency departments suggest a right hip 

fracture and also call the orthopedic and suggest to admit in a cardiac and 

medical clearance


When I saw the patient on the floor patient is alert awake but underlying 

dementia is








Hospital Course


Hospital Course: 


Patient was admitted following presentation to the ED follow a fall at home with

right hip pain and difficulty with ambulation. her ED evaluation revealed right 

hip fracture. She was taken to the operating suite on 08/09/2020 for ORIF. Post 

operatively, she was transfused 2 units of PRBC during this hospitalization due 

to anaemia of multifactorial reasons. She participated in physical therapy 

session. She will be transferred to [facility] for short term rehabilitation. 

She will benefit from home health agency service including visiting nurse, 

physical therapy and personal aide services upon discharge from the SNF. She 

will follow up with orthopedic surgeon and myself as instructed upon discharge.





Physical Exam


Vital Signs: 


                                        











Temp Pulse Resp BP Pulse Ox


 


 98.3 F   90   19   141/77 H  100 


 


 08/14/20 12:23  08/14/20 12:23  08/14/20 12:23  08/14/20 12:23  08/14/20 12:23








                                 Intake & Output











 08/13/20 08/14/20 08/15/20





 06:59 06:59 06:59


 


Intake Total 2000 598 1480


 


Balance 2000 598 1480


 


Weight 76 kg 75.2 kg 75.2 kg




















General appearance: PRESENT: no acute distress, out of bed in chair.


Head exam: PRESENT: atraumatic, normocephalic


Eye exam: PRESENT: conjunctiva pink.  ABSENT: pallor, sclera icterus


Mouth exam: PRESENT: moist


Respiratory exam: PRESENT: clear to auscultation bilaterally


Cardiovascular exam: PRESENT: irregular rhythm, +S1, +S2.  ABSENT: diastolic 

murmur, rubs, systolic murmur


GI/Abdominal exam: PRESENT: normal bowel sounds, soft.  ABSENT: organomegaly, 

tenderness


Extremities exam: PRESENT: pedal edema


Musculoskeletal exam: PRESENT: deformity - related to multiple joints 

involvement with arthritis


Neurological exam: PRESENT: alert, awake, appropriate and cooperative with 

examination


Skin exam: PRESENT: dry, warm, other - right hip surgical site dressing is 

satisfactory.














Results


Laboratory Results: 


                                        











WBC  11.5 10^3/uL (4.0-10.5)  H  08/12/20  05:46    


 


RBC  4.24 10^6/uL (3.72-5.28)   08/12/20  05:46    


 


Hgb  10.2 g/dL (12.0-15.5)  L D 08/12/20  05:46    


 


Hct  32.3 % (36.0-47.0)  L  08/12/20  05:46    


 


MCV  76 fl (80-97)  L  08/12/20  05:46    


 


MCH  24.2 pg (27.0-33.4)  L  08/12/20  05:46    


 


MCHC  31.8 g/dL (32.0-36.0)  L  08/12/20  05:46    


 


RDW  16.4 % (11.5-14.0)  H  08/12/20  05:46    


 


Plt Count  148 10^3/uL (150-450)  L  08/12/20  05:46    


 


Lymph % (Auto)  11.1 % (13-45)  L  08/12/20  05:46    


 


Mono % (Auto)  11.0 % (3-13)   08/12/20  05:46    


 


Eos % (Auto)  0.4 % (0-6)   08/12/20  05:46    


 


Baso % (Auto)  0.3 % (0-2)   08/12/20  05:46    


 


Absolute Neuts (auto)  8.9 10^3/uL (1.7-8.2)  H  08/12/20  05:46    


 


Absolute Lymphs (auto)  1.3 10^3/uL (0.5-4.7)   08/12/20  05:46    


 


Absolute Monos (auto)  1.3 10^3/uL (0.1-1.4)   08/12/20  05:46    


 


Absolute Eos (auto)  0.0 10^3/uL (0.0-0.6)   08/12/20  05:46    


 


Absolute Basos (auto)  0.0 10^3/uL (0.0-0.2)   08/12/20  05:46    


 


Seg Neutrophils %  77.2 % (42-78)   08/12/20  05:46    


 


PT  14.8 SEC (11.4-15.4)   08/08/20  10:18    


 


INR  1.14   08/08/20  10:18    


 


APTT  26.4 SEC (23.5-35.8)   08/08/20  10:18    


 


Sodium  137.0 mmol/L (137-145)   08/12/20  05:46    


 


Potassium  4.6 mmol/L (3.6-5.0)   08/12/20  05:46    


 


Chloride  109 mmol/L ()  H  08/12/20  05:46    


 


Carbon Dioxide  24 mmol/L (22-30)   08/12/20  05:46    


 


Anion Gap  4  (5-19)  L  08/12/20  05:46    


 


BUN  25 mg/dL (7-20)  H  08/12/20  05:46    


 


Creatinine  0.96 mg/dL (0.52-1.25)   08/12/20  05:46    


 


Est GFR ( Amer)  > 60  (>60)   08/12/20  05:46    


 


Est GFR (Non-Af Amer)  Cancelled   08/08/20  10:18    


 


Est GFR (MDRD) Non-Af  55  (>60)  L  08/12/20  05:46    


 


Glucose  94 mg/dL ()   08/12/20  05:46    


 


Calcium  8.7 mg/dL (8.4-10.2)   08/12/20  05:46    


 


Phosphorus  3.1 mg/dL (2.5-4.5)   08/10/20  04:08    


 


Magnesium  2.3 mg/dL (1.6-2.3)   08/11/20  04:34    


 


Creatine Kinase  69 U/L ()   08/09/20  01:31    


 


CK-MB (CK-2)  1.58 ng/mL (<4.55)   08/09/20  01:31    


 


Troponin I  0.025 ng/mL  08/09/20  01:31    


 


NT-Pro-B Natriuret Pep  4540 pg/mL (<450)  H  08/11/20  04:34    


 


EGFR   Cancelled   08/08/20  10:18    


 


Vitamin D 25-Hydroxy  38.4 ng/mL (14.7-68.3)   08/10/20  04:08    


 


TSH  2.62 uIU/mL (0.47-4.68)   08/08/20  10:18    


 


PTH Intact  135.1 pg/mL (10.0-65.0)  H  08/10/20  13:15    


 


SARS-CoV-2 (PCR)  NEGATIVE  (NEGATIVE)   08/08/20  12:30    


 


Blood Type  A POSITIVE   08/11/20  18:40    


 


Antibody Screen  NEGATIVE   08/11/20  18:40    


 


Crossmatch  See Detail   08/11/20  18:40    








                                        











  08/08/20 08/08/20 08/09/20





  10:18 19:30 01:31


 


CK-MB (CK-2)  1.86  1.83  1.58


 


Troponin I  0.020  0.024  0.025


 


NT-Pro-B Natriuret Pep   














  08/09/20 08/10/20 08/11/20





  07:04 04:08 04:34


 


CK-MB (CK-2)   


 


Troponin I   


 


NT-Pro-B Natriuret Pep  466 H  1320 H  4540 H











Impressions: 


                                        





Cervical Spine CT  08/08/20 10:21


IMPRESSION:


1. No acute fracture or dislocation of the cervical spine.


2. Mild degenerative disc disease and spondylosis.


3. Enlarged right thyroid lobe, unchanged from prior.


 








Head CT  08/08/20 10:21


IMPRESSION:


1. No acute intracranial hemorrhage, mass, or evidence of acute territorial infa

rct.


2. Severe chronic small vessel ischemic change.


EVIDENCE OF ACUTE STROKE: NO.


 








Pelvis CT  08/08/20 10:21


IMPRESSION:  Acute intertrochanteric fracture right femur.  Mild displacement of

the greater and lesser trochanters.


 








Hip/Pelvis X-Ray  08/08/20 11:50


IMPRESSION:  Intertrochanteric fracture right femoral neck.


 








Chest X-Ray  08/08/20 11:54


IMPRESSION:  NO ACUTE RADIOGRAPHIC FINDING IN THE CHEST.


 








Fluoroscopy  08/09/20 00:00


IMPRESSION:  IMAGE(S) OBTAINED DURING PROCEDURE.


 








Hip/Pelvis X-Ray  08/09/20 00:00


IMPRESSION:  IMAGE(S) OBTAINED DURING PROCEDURE.


 














Plan


Health Concerns: 


High risk for readmission due to her morbidities.


Plan of Treatment: 


Short term rehabilitation.


Goals: 


Short term rehabilitation at SNF. Medication management and close follow up.


Time Spent: Greater than 30 Minutes





Stroke


Is this a Stroke Patient?: No





Acute Heart Failure





- **


Is this a Heart Failure Patient?: No

## 2020-10-06 ENCOUNTER — HOSPITAL ENCOUNTER (EMERGENCY)
Dept: HOSPITAL 62 - ER | Age: 85
Discharge: HOME | End: 2020-10-06
Payer: MEDICARE

## 2020-10-06 VITALS — SYSTOLIC BLOOD PRESSURE: 162 MMHG | DIASTOLIC BLOOD PRESSURE: 79 MMHG

## 2020-10-06 DIAGNOSIS — M79.89: ICD-10-CM

## 2020-10-06 DIAGNOSIS — I11.0: ICD-10-CM

## 2020-10-06 DIAGNOSIS — F03.90: ICD-10-CM

## 2020-10-06 DIAGNOSIS — Z79.899: ICD-10-CM

## 2020-10-06 DIAGNOSIS — Z88.8: ICD-10-CM

## 2020-10-06 DIAGNOSIS — I50.9: ICD-10-CM

## 2020-10-06 DIAGNOSIS — M25.561: ICD-10-CM

## 2020-10-06 DIAGNOSIS — M17.11: Primary | ICD-10-CM

## 2020-10-06 DIAGNOSIS — Z96.641: ICD-10-CM

## 2020-10-06 LAB
ADD MANUAL DIFF: NO
ALBUMIN SERPL-MCNC: 3.9 G/DL (ref 3.5–5)
ALP SERPL-CCNC: 145 U/L (ref 38–126)
ANION GAP SERPL CALC-SCNC: 11 MMOL/L (ref 5–19)
APPEARANCE UR: (no result)
APTT BLD: 31.5 SEC (ref 23.5–35.8)
APTT PPP: YELLOW S
AST SERPL-CCNC: 33 U/L (ref 14–36)
BASOPHILS # BLD AUTO: 0 10^3/UL (ref 0–0.2)
BASOPHILS NFR BLD AUTO: 0.3 % (ref 0–2)
BILIRUB DIRECT SERPL-MCNC: 0.4 MG/DL (ref 0–0.4)
BILIRUB SERPL-MCNC: 0.6 MG/DL (ref 0.2–1.3)
BILIRUB UR QL STRIP: NEGATIVE
BUN SERPL-MCNC: 13 MG/DL (ref 7–20)
CALCIUM: 10.4 MG/DL (ref 8.4–10.2)
CHLORIDE SERPL-SCNC: 108 MMOL/L (ref 98–107)
CO2 SERPL-SCNC: 23 MMOL/L (ref 22–30)
EOSINOPHIL # BLD AUTO: 0.1 10^3/UL (ref 0–0.6)
EOSINOPHIL NFR BLD AUTO: 0.6 % (ref 0–6)
ERYTHROCYTE [DISTWIDTH] IN BLOOD BY AUTOMATED COUNT: 15.5 % (ref 11.5–14)
GLUCOSE SERPL-MCNC: 118 MG/DL (ref 75–110)
GLUCOSE UR STRIP-MCNC: NEGATIVE MG/DL
HCT VFR BLD CALC: 38.7 % (ref 36–47)
HGB BLD-MCNC: 12.2 G/DL (ref 12–15.5)
INR PPP: 1.14
KETONES UR STRIP-MCNC: NEGATIVE MG/DL
LYMPHOCYTES # BLD AUTO: 2.4 10^3/UL (ref 0.5–4.7)
LYMPHOCYTES NFR BLD AUTO: 25.7 % (ref 13–45)
MCH RBC QN AUTO: 24 PG (ref 27–33.4)
MCHC RBC AUTO-ENTMCNC: 31.5 G/DL (ref 32–36)
MCV RBC AUTO: 76 FL (ref 80–97)
MONOCYTES # BLD AUTO: 0.4 10^3/UL (ref 0.1–1.4)
MONOCYTES NFR BLD AUTO: 4.5 % (ref 3–13)
NEUTROPHILS # BLD AUTO: 6.4 10^3/UL (ref 1.7–8.2)
NEUTS SEG NFR BLD AUTO: 68.9 % (ref 42–78)
NITRITE UR QL STRIP: NEGATIVE
PH UR STRIP: 5 [PH] (ref 5–9)
PLATELET # BLD: 284 10^3/UL (ref 150–450)
POTASSIUM SERPL-SCNC: 3.8 MMOL/L (ref 3.6–5)
PROT SERPL-MCNC: 7.9 G/DL (ref 6.3–8.2)
PROT UR STRIP-MCNC: 30 MG/DL
PROTHROMBIN TIME: 14.8 SEC (ref 11.4–15.4)
RBC # BLD AUTO: 5.08 10^6/UL (ref 3.72–5.28)
SP GR UR STRIP: 1.02
TOTAL CELLS COUNTED % (AUTO): 100 %
UROBILINOGEN UR-MCNC: 2 MG/DL (ref ?–2)
WBC # BLD AUTO: 9.4 10^3/UL (ref 4–10.5)

## 2020-10-06 PROCEDURE — 85730 THROMBOPLASTIN TIME PARTIAL: CPT

## 2020-10-06 PROCEDURE — 93971 EXTREMITY STUDY: CPT

## 2020-10-06 PROCEDURE — 80053 COMPREHEN METABOLIC PANEL: CPT

## 2020-10-06 PROCEDURE — 85025 COMPLETE CBC W/AUTO DIFF WBC: CPT

## 2020-10-06 PROCEDURE — 83690 ASSAY OF LIPASE: CPT

## 2020-10-06 PROCEDURE — 36415 COLL VENOUS BLD VENIPUNCTURE: CPT

## 2020-10-06 PROCEDURE — 85610 PROTHROMBIN TIME: CPT

## 2020-10-06 PROCEDURE — 81001 URINALYSIS AUTO W/SCOPE: CPT

## 2020-10-06 PROCEDURE — 99285 EMERGENCY DEPT VISIT HI MDM: CPT

## 2020-10-06 NOTE — RADIOLOGY REPORT (SQ)
EXAM DESCRIPTION:  KNEE RIGHT 3 VIEWS



IMAGES COMPLETED DATE/TIME:  10/6/2020 1:29 pm



REASON FOR STUDY:  pain swelling



COMPARISON:  5/26/2010



NUMBER OF VIEWS:  Three views.



TECHNIQUE:  AP, lateral, and single oblique radiographic images acquired of the right knee.



LIMITATIONS:  None.



FINDINGS:  MINERALIZATION: Normal.

BONES: No acute fracture or dislocation.  No worrisome bone lesions.

JOINT: No effusion.  Mild loss of medial and lateral compartment joint space.

SOFT TISSUES: No soft tissue swelling.  No radio-opaque foreign body.

OTHER: No other significant finding.



IMPRESSION:  No evidence of acute osseous injury.  Background of mild degenerative changes.



TECHNICAL DOCUMENTATION:  JOB ID:  [id number]

 2011 [employer]- All Rights Reserved



Reading location - IP/workstation name: [email or web address]

## 2020-10-06 NOTE — ER DOCUMENT REPORT
ED General





- General


Chief Complaint: Leg Swelling


Stated Complaint: LEG PAIN


Time Seen by Provider: 10/06/20 12:23


Primary Care Provider: 


KRISTEL GIBBONS MD [Primary Care Provider] - Follow up as needed


Mode of Arrival: Wheelchair


TRAVEL OUTSIDE OF THE [place] IN LAST 30 DAYS: No





- HPI


Notes: 





Chief complaint: Intermittent pain and swelling right knee





History of present illness: 84-year-old female followed by Dr. Gibbons with 

history of moderate dementia, generalized osteoarthritis and previous repair of 

right hip fracture in August year now returns to the emergency department by her

daughter who is her principal caretaker valuation of intermittent pain and 

swelling of right knee.  No trauma.  Patient lives at home with daughter and is 

nonambulatory as per baseline.  Patient has no breast complaints otherwise at 

this time.  Taking tramadol 1 tablet a day for pain.





- Related Data


Allergies/Adverse Reactions: 


                                        





lisinopril Adverse Reaction (Mild, Verified 10/06/20 14:09)


   cough











Past Medical History





- General


Information source: Relative, [facility] Records





- Social History


Smoking Status: Never Smoker


Frequency of alcohol use: None


Drug Abuse: None


Family History: Reviewed & Not Pertinent





- Past Medical History


Cardiac Medical History: Reports: Hx Congestive Heart Failure, Hx Hypertension -

medicated


   Denies: Hx Atrial Fibrillation, Hx Coronary Artery Disease, Hx Heart Attack, 

Hx Hypercholesterolemia, Hx Peripheral Vascular Disease, Hx Heart Murmur


Pulmonary Medical History: 


   Denies: Hx Asthma, Hx Bronchitis, Hx COPD, Hx Pneumonia, Hx Tuberculosis


Neurological Medical History: Denies: Hx Cerebrovascular Accident, Hx Seizures, 

Hx Parkinson's Disease


Renal/ Medical History: Denies: Hx Peritoneal Dialysis


GI Medical History: Denies: Hx Hepatitis, Hx Hiatal Hernia, Hx Ulcer


Musculoskeletal Medical History: Reports Hx Arthritis, Denies Hx Multiple 

Sclerosis


Psychiatric Medical History: Reports: Hx Dementia


   Denies: Hx Depression


Infectious Medical History: Denies: Hx Hepatitis


Past Surgical History: Reports: Hx Tubal Ligation.  Denies: Hx Appendectomy, Hx 

Bowel Surgery, Hx  Section, Hx Cholecystectomy, Hx Coronary Artery 

Bypass Graft, Hx Gastric Bypass Surgery, Hx Herniorrhaphy, Hx Hysterectomy, Hx 

Mastectomy, Hx Open Heart Surgery, Hx Pacemaker, Hx Tonsillectomy





- Immunizations


Hx Pneumococcal Vaccination: 14





Review of Systems





- Review of Systems


-: Yes ROS unobtainable due to patient's medical condition





Physical Exam





- Vital signs


Vitals: 


                                        











Temp Pulse Resp BP Pulse Ox


 


 98.5 F   82   16   146/58 H  100 


 


 10/06/20 11:09  10/06/20 11:09  10/06/20 11:09  10/06/20 11:09  10/06/20 11:09














- Notes


Notes: 











GENERAL: Elderly female quietly lying in bed appearing in no acute distress.





SKIN: Good turgor no rashes.





HEAD: Normocephalic atraumatic.





EYES: PROMINENT BILATERAL ARCUS SENILIS.  PERRLA.  EOMI.  Conjunctivae and 

sclerae clear.





EARS: CANALS AND TMS CLEAR.





NOSE: CLEAR.





MOUTH: Moist mucosa.  Good dentition.  No stridor or edema.  No drooling.





NECK: Supple.  No masses or thyromegaly.  No adenopathy.  Carotids 2+ without 

bruits.  No JVD.





BACK: Symmetrical without tenderness.





CHEST: Respirations unlabored.  Breath sounds clear and symmetrical.





HEART: Regular rhythm.  No murmur gallop or rub.





ABDOMEN: Soft nontender without masses, organomegaly or rebound.  Bowel sounds 

normally active.  No bruits.





GENITALIA: Deferred.





EXTREMITIES: Mild soft tissue swelling right knee without tenderness or gross 

effusion.  Good range of motion of knee with mild crepitus present.  1+ 

pretibial edema on the right side.  No calf tenderness.  Cap refill less than 1

.5 seconds.  Dorsalis pedis and posterior tibial pulses 3+ and symmetrical.





NEUROLOGICAL: GCS 14.  Alert and oriented to person and place but not date as 

per baseline.  Fluent speech.  Cranial nerves II through XII intact.  

Sensorimotor and cerebellar normal.  Normal tone.





PSYCHIATRIC: Flat affect.





Course





- Re-evaluation


Re-evalutation: 





10/06/20 16:59


No findings of DVT on ultrasound.  Labs are unremarkable.  No fever.  X-ray and 

physical exam consistent with osteoarthritis of right knee.  This lady's 

advanced age I do not think she is a good candidate for nonsteroidal anti-

inflammatory agents.  I am advising daughter she can give the tramadol more than

once a day as necessary and use ice packs as needed.  Follow-up with PMD.





Findings, clinical impression and plan of treatment have been discussed with 

patient/family.  Understanding of current findings and recommendations has been 

acknowledged by them and there is agreement regarding disposition and follow-up.





- Vital Signs


Vital signs: 


                                        











Temp Pulse Resp BP Pulse Ox


 


 98.5 F   82   16   146/58 H  100 


 


 10/06/20 12:26  10/06/20 11:09  10/06/20 11:09  10/06/20 11:09  10/06/20 11:09














- Laboratory


Result Diagrams: 


                                 10/06/20 13:17





                                 10/06/20 13:17


Laboratory results interpreted by me: 


                                        











  10/06/20 10/06/20 10/06/20





  13:17 13:17 15:50


 


MCV  76 L  


 


MCH  24.0 L  


 


MCHC  31.5 L  


 


RDW  15.5 H  


 


Chloride   108 H 


 


Glucose   118 H 


 


Calcium   10.4 H 


 


Alkaline Phosphatase   145 H 


 


Urine Protein    30 H


 


Urine Urobilinogen    2.0 H


 


Urine Ascorbic Acid    40 H














- Diagnostic Test


Radiology reviewed: Reports reviewed - Per radiologist: Doppler ultrasound right

lower extremity negative for DVT.  Mild to moderate degenerative changes of 

right knee on plain films.





Discharge





- Discharge


Clinical Impression: 


Osteoarthritis of right knee


Qualifiers:


 Osteoarthritis type: unspecified Qualified Code(s): M17.11 - Unilateral primary

osteoarthritis, right knee





Condition: Stable


Disposition: HOME, SELF-CARE


Additional Instructions: 


Use tramadol and ice packs as needed.  Follow-up with your primary care 

physician within the next 2 weeks.


Referrals: 


KRISTEL GIBBONS MD [Primary Care Provider] - Follow up as needed

## 2020-10-06 NOTE — ER DOCUMENT REPORT
ED Medical Screen (RME)





- General


Chief Complaint: Leg Pain


Stated Complaint: LEG PAIN


Time Seen by Provider: 10/06/20 12:23


Primary Care Provider: 


KRISTEL GIBBONS MD [Primary Care Provider] - Follow up as needed


Mode of Arrival: Wheelchair


Information source: Relative


Notes: 





84-year-old female presented to ED for complaint of pain and swelling to the 

right leg.  She had hip replacement on  of this year.  Daughter states 

that the leg has been much more swollen since the last 2 weeks.  She states she 

has been massaging it and elevating it but it is still very painful and swells 

when the leg is down.  She does not know if someone has hit the leg at some 

point.  She does get physical therapy.  Patient  does have Alzheimer's daughter 

is her caregiver.  She also has physical therapy.  We will get blood urine and a

Doppler for the right leg also get a x-ray of the knee.

















I have greeted and performed a rapid initial assessment of this patient.  A 

comprehensive ED assessment and evaluation of the patient, analysis of test 

results and completion of medical decision making process will be conducted by 

an additional ED providers.


TRAVEL OUTSIDE OF THE [place] IN LAST 30 DAYS: No





- Related Data


Allergies/Adverse Reactions: 


                                        





lisinopril Adverse Reaction (Mild, Verified 20 20:40)


   cough











Past Medical History





- Past Medical History


Cardiac Medical History: Reports: Hx Congestive Heart Failure, Hx Hypertension -

medicated


   Denies: Hx Atrial Fibrillation, Hx Coronary Artery Disease, Hx Heart Attack, 

Hx Hypercholesterolemia, Hx Peripheral Vascular Disease, Hx Heart Murmur


Pulmonary Medical History: 


   Denies: Hx Asthma, Hx Bronchitis, Hx COPD, Hx Pneumonia, Hx Tuberculosis


Neurological Medical History: Denies: Hx Cerebrovascular Accident, Hx Seizures, 

Hx Parkinson's Disease


Renal/ Medical History: Denies: Hx Peritoneal Dialysis


GI Medical History: Denies: Hx Hepatitis, Hx Hiatal Hernia, Hx Ulcer


Musculoskeltal Medical History: Denies Hx Multiple Sclerosis


Psychiatric Medical History: Reports: Hx Dementia


   Denies: Hx Depression


Infectious Medical History: Denies: Hx Hepatitis


Past Surgical History: Reports: Hx Tubal Ligation.  Denies: Hx Appendectomy, Hx 

Bowel Surgery, Hx  Section, Hx Cholecystectomy, Hx Coronary Artery 

Bypass Graft, Hx Gastric Bypass Surgery, Hx Herniorrhaphy, Hx Hysterectomy, Hx 

Mastectomy, Hx Open Heart Surgery, Hx Pacemaker, Hx Tonsillectomy





Physical Exam





- Vital signs


Vitals: 





                                        











Temp Pulse Resp BP Pulse Ox


 


 98.5 F   82   16   146/58 H  100 


 


 10/06/20 11:09  10/06/20 11:09  10/06/20 11:09  10/06/20 11:09  10/06/20 11:09














Course





- Vital Signs


Vital signs: 





                                        











Temp Pulse Resp BP Pulse Ox


 


 98.5 F   82   16   146/58 H  100 


 


 10/06/20 11:09  10/06/20 11:09  10/06/20 11:09  10/06/20 11:09  10/06/20 11:09














Doctor's Discharge





- Discharge


Referrals: 


KRISTEL GIBBONS MD [Primary Care Provider] - Follow up as needed

## 2020-10-06 NOTE — RADIOLOGY REPORT (SQ)
EXAM DESCRIPTION:  VENOUS UNILATERAL LOWER



IMAGES COMPLETED DATE/TIME:  10/6/2020 4:39 pm



REASON FOR STUDY:  right lower leg swelling



COMPARISON:  5/19/2020



TECHNIQUE:  Dynamic and static gray scale and color images acquired of the right leg venous system. S
elected spectral images acquired with additional compression and augmentation maneuvers. The contrala
teral common femoral vein and saphenofemoral junction were also imaged. Images stored on PACS.



LIMITATIONS:  None.



FINDINGS:  COMMON FEMORAL: Normal phasicity, compression and augmentation. No visualized echogenic ma
terial on gray scale. No defects on color images.

FEMORAL: Normal compression and augmentation. No visualized echogenic material on gray scale. No defe
cts on color images.

POPLITEAL: Normal compression, augmentation. No visualized echogenic material on gray scale. No defec
ts on color images.

CALF VESSELS: Normal compression, augmentation. No visualized echogenic material on gray scale. No de
fects on color images.

GSV and SSV: Normal compression, augmentation. No visualized echogenic material on gray scale. No def
ects on color images.

ANY DEEP VENOUS INSUFFICIENCY: Not evaluated.

ANY EVIDENCE OF POPLITEAL CYST: No.

OTHER: No other significant finding.

CONTRALATERAL COMMON FEMORAL VEIN AND SAPHENOFEMORAL JUNCTION:

Normal phasicity, compression and augmentation. No visualized echogenic material on gray scale. No de
fects on color images.



IMPRESSION:  NO EVIDENCE OF DVT OR SVT IN THE RIGHT LEG.



TECHNICAL DOCUMENTATION:  JOB ID:  [id number]

[id number]

 2011 [email or web address]- All Rights Reserved



Reading location - IP/workstation name: [email or web address]

## 2020-10-20 ENCOUNTER — HOSPITAL ENCOUNTER (EMERGENCY)
Dept: HOSPITAL 62 - ER | Age: 85
Discharge: HOME | End: 2020-10-20
Payer: MEDICARE

## 2020-10-20 VITALS — SYSTOLIC BLOOD PRESSURE: 108 MMHG | DIASTOLIC BLOOD PRESSURE: 75 MMHG

## 2020-10-20 DIAGNOSIS — Z98.51: ICD-10-CM

## 2020-10-20 DIAGNOSIS — Z79.02: ICD-10-CM

## 2020-10-20 DIAGNOSIS — N30.01: Primary | ICD-10-CM

## 2020-10-20 DIAGNOSIS — I11.0: ICD-10-CM

## 2020-10-20 DIAGNOSIS — F03.90: ICD-10-CM

## 2020-10-20 DIAGNOSIS — I50.9: ICD-10-CM

## 2020-10-20 DIAGNOSIS — K57.90: ICD-10-CM

## 2020-10-20 LAB
ADD MANUAL DIFF: NO
ALBUMIN SERPL-MCNC: 3.6 G/DL (ref 3.5–5)
ALP SERPL-CCNC: 113 U/L (ref 38–126)
ANION GAP SERPL CALC-SCNC: 10 MMOL/L (ref 5–19)
APPEARANCE UR: CLEAR
APTT PPP: (no result) S
AST SERPL-CCNC: 35 U/L (ref 14–36)
BASOPHILS # BLD AUTO: 0 10^3/UL (ref 0–0.2)
BASOPHILS NFR BLD AUTO: 0.4 % (ref 0–2)
BILIRUB DIRECT SERPL-MCNC: 0.4 MG/DL (ref 0–0.4)
BILIRUB SERPL-MCNC: 0.6 MG/DL (ref 0.2–1.3)
BILIRUB UR QL STRIP: NEGATIVE
BUN SERPL-MCNC: 23 MG/DL (ref 7–20)
CALCIUM: 10.6 MG/DL (ref 8.4–10.2)
CHLORIDE SERPL-SCNC: 106 MMOL/L (ref 98–107)
CO2 SERPL-SCNC: 25 MMOL/L (ref 22–30)
EOSINOPHIL # BLD AUTO: 0.1 10^3/UL (ref 0–0.6)
EOSINOPHIL NFR BLD AUTO: 0.9 % (ref 0–6)
ERYTHROCYTE [DISTWIDTH] IN BLOOD BY AUTOMATED COUNT: 15 % (ref 11.5–14)
GLUCOSE SERPL-MCNC: 105 MG/DL (ref 75–110)
GLUCOSE UR STRIP-MCNC: NEGATIVE MG/DL
HCT VFR BLD CALC: 36.4 % (ref 36–47)
HGB BLD-MCNC: 11.6 G/DL (ref 12–15.5)
KETONES UR STRIP-MCNC: NEGATIVE MG/DL
LYMPHOCYTES # BLD AUTO: 2.2 10^3/UL (ref 0.5–4.7)
LYMPHOCYTES NFR BLD AUTO: 23.7 % (ref 13–45)
MCH RBC QN AUTO: 24.1 PG (ref 27–33.4)
MCHC RBC AUTO-ENTMCNC: 32 G/DL (ref 32–36)
MCV RBC AUTO: 75 FL (ref 80–97)
MONOCYTES # BLD AUTO: 0.5 10^3/UL (ref 0.1–1.4)
MONOCYTES NFR BLD AUTO: 5.6 % (ref 3–13)
NEUTROPHILS # BLD AUTO: 6.5 10^3/UL (ref 1.7–8.2)
NEUTS SEG NFR BLD AUTO: 69.4 % (ref 42–78)
PH UR STRIP: 6 [PH] (ref 5–9)
PLATELET # BLD: 245 10^3/UL (ref 150–450)
POTASSIUM SERPL-SCNC: 4.8 MMOL/L (ref 3.6–5)
PROT SERPL-MCNC: 7.4 G/DL (ref 6.3–8.2)
PROT UR STRIP-MCNC: >=500 MG/DL
RBC # BLD AUTO: 4.84 10^6/UL (ref 3.72–5.28)
SP GR UR STRIP: 1.02
TOTAL CELLS COUNTED % (AUTO): 100 %
UROBILINOGEN UR-MCNC: 2 MG/DL (ref ?–2)
WBC # BLD AUTO: 9.4 10^3/UL (ref 4–10.5)

## 2020-10-20 PROCEDURE — 85025 COMPLETE CBC W/AUTO DIFF WBC: CPT

## 2020-10-20 PROCEDURE — 80053 COMPREHEN METABOLIC PANEL: CPT

## 2020-10-20 PROCEDURE — 86850 RBC ANTIBODY SCREEN: CPT

## 2020-10-20 PROCEDURE — 81001 URINALYSIS AUTO W/SCOPE: CPT

## 2020-10-20 PROCEDURE — 74176 CT ABD & PELVIS W/O CONTRAST: CPT

## 2020-10-20 PROCEDURE — 36415 COLL VENOUS BLD VENIPUNCTURE: CPT

## 2020-10-20 PROCEDURE — 96372 THER/PROPH/DIAG INJ SC/IM: CPT

## 2020-10-20 PROCEDURE — 87186 SC STD MICRODIL/AGAR DIL: CPT

## 2020-10-20 PROCEDURE — 86901 BLOOD TYPING SEROLOGIC RH(D): CPT

## 2020-10-20 PROCEDURE — 51701 INSERT BLADDER CATHETER: CPT

## 2020-10-20 PROCEDURE — 86900 BLOOD TYPING SEROLOGIC ABO: CPT

## 2020-10-20 PROCEDURE — 99285 EMERGENCY DEPT VISIT HI MDM: CPT

## 2020-10-20 PROCEDURE — 87086 URINE CULTURE/COLONY COUNT: CPT

## 2020-10-20 PROCEDURE — 87088 URINE BACTERIA CULTURE: CPT

## 2020-10-20 NOTE — ER DOCUMENT REPORT
ED GI/





- General


Chief Complaint: Blood in Catheter


Stated Complaint: BLOOD IN URINE


Time Seen by Provider: 10/20/20 19:06


Primary Care Provider: 


KRISTEL GIBBONS MD [Primary Care Provider] - Follow up as needed


Notes: 





CHIEF COMPLAINT: Possible hematuria





HPI: History is obtained from the daughter secondary to patient dementia.  

84-year-old female who is on Eliquis brought for possible hematuria.  Patient is

incontinent normally and wears a diaper her daughter noticed that she has had 

some blood in the diaper intermittently.  No fever.  No abdominal pain.  

Daughter states they were here 2 weeks ago and patient was catheterized for 

urine at that time and she states that was when she for started noticing the 

bleeding.  Has not seen patient scratching at the vaginal region.  Has not taken

the patient to her primary care provider for evaluation.





ROS: See HPI - all other systems were reviewed and are otherwise negative


Constitutional: no fever 


GI: no vomiting, no diarrhea, no abdominal pain


: no dysuria, possible hematuria


Integumentary: no rash 


Allergy: no hives 


Musculoskeletal: no extremity pain or swelling 


Neurological: no numbness/tingling, no weakness





MEDICATIONS: I agree with the patient medications as charted by the RN.





ALLERGIES: I agree with the allergies as charted by the RN.





PAST MEDICAL HISTORY/PAST SURGICAL HISTORY: Reviewed and agree as charted by RN.





SOCIAL HISTORY: Reviewed and agree as charted by RN.





FAMILY HISTORY: No significant familial comorbid conditions directly related to 

patient complaint





EXAM:


Reviewed vital signs as charted by RN.


CONSTITUTIONAL: Alert and oriented and responds appropriately to questions. 

Well-appearing; well-nourished


HEAD: Normocephalic; atraumatic


EYES: PERRL; Conjunctivae clear, sclerae non-icteric


ENT: normal nose; no rhinorrhea; moist mucous membranes


NECK: Supple without meningismus; non-tender; no cervical lymphadenopathy, no 

masses


CARD: RRR; no murmurs, no clicks, no rubs, no gallops; symmetric distal pulses


RESP: Normal chest excursion without splinting or tachypnea; breath sounds clear

and equal bilaterally; no wheezes, no rhonchi, no rales, pulse oximetry 98% on 

room air not hypoxic


ABD/GI: Normal bowel sounds; non-distended; soft, non-tender, no rebound, no 

guarding; no palpable organomegaly or masses.


: With female chaperone present external vaginal region was examined.  Normal 

female external genitalia.  There is no definitive scratch or area of bleeding 

noted.  There is some bright red blood in the diaper.  No definite vaginal 

bleeding is noted.


BACK:  The back appears normal and is non-tender to palpation, there is no CVA 

tenderness


EXT: Normal ROM in all joints; non-tender to palpation; no cyanosis, no 

effusions, no edema   


SKIN: Normal color for age and race; warm; dry; good turgor; no acute lesions 

noted


NEURO: Moves all extremities equally; Motor and sensory function intact 


PSYCH: The patient's mood and manner are appropriate. Grooming and personal 

hygiene are appropriate.





MDM:


TRAVEL OUTSIDE OF THE [place] IN LAST 30 DAYS: No





- Related Data


Allergies/Adverse Reactions: 


                                        





lisinopril Adverse Reaction (Mild, Verified 10/20/20 19:06)


   cough








Home Medications: LASIX.  LOSARTAN.  CARDIZEM.  MULTIVITAMIN.  MEGACE.  

TRAMADOL.  IRON





Past Medical History





- Social History


Smoking Status: Never Smoker


Chew tobacco use (# tins/day): No


Frequency of alcohol use: None


Drug Abuse: None


Family History: Reviewed & Not Pertinent


Patient has homicidal ideation: No





- Past Medical History


Cardiac Medical History: Reports: Hx Congestive Heart Failure, Hx Hypertension -

medicated


   Denies: Hx Atrial Fibrillation, Hx Coronary Artery Disease, Hx Heart Attack, 

Hx Hypercholesterolemia, Hx Peripheral Vascular Disease, Hx Heart Murmur


Pulmonary Medical History: 


   Denies: Hx Asthma, Hx Bronchitis, Hx COPD, Hx Pneumonia, Hx Tuberculosis


Neurological Medical History: Denies: Hx Cerebrovascular Accident, Hx Seizures, 

Hx Parkinson's Disease


Renal/ Medical History: Denies: Hx Peritoneal Dialysis


GI Medical History: Denies: Hx Hepatitis, Hx Hiatal Hernia, Hx Ulcer


Musculoskeletal Medical History: Reports Hx Arthritis, Denies Hx Multiple 

Sclerosis


Psychiatric Medical History: Reports: Hx Dementia


   Denies: Hx Depression


Infectious Medical History: Denies: Hx Hepatitis


Past Surgical History: Reports: Hx Tubal Ligation.  Denies: Hx Appendectomy, Hx 

Bowel Surgery, Hx  Section, Hx Cholecystectomy, Hx Coronary Artery 

Bypass Graft, Hx Gastric Bypass Surgery, Hx Herniorrhaphy, Hx Hysterectomy, Hx 

Mastectomy, Hx Open Heart Surgery, Hx Pacemaker, Hx Tonsillectomy





- Immunizations


Hx Pneumococcal Vaccination: 14





Physical Exam





- Vital signs


Vitals: 


                                        











Temp Pulse Resp BP Pulse Ox


 


 98.3 F   91   16   115/64   96 


 


 10/20/20 18:57  10/20/20 18:57  10/20/20 18:57  10/20/20 18:57  10/20/20 18:57














Course





- Re-evaluation


Re-evalutation: 





10/20/20 22:16


CT imaging shows calcified fibroids, diverticulosis without diverticulitis, 

constipation.  Urine shows likely hemorrhagic cystitis.  Will give Rocephin in 

the emergency department keep patient on Keflex.  Will prescribe Dulcolax 

suppository for home use for the constipation.  Follow-up with PCP.  Discussed 

at length with the daughter who is comfortable with the plan





- Vital Signs


Vital signs: 


                                        











Temp Pulse Resp BP Pulse Ox


 


 98.3 F   91   16   115/64   96 


 


 10/20/20 19:06  10/20/20 18:57  10/20/20 18:57  10/20/20 18:57  10/20/20 18:57














- Laboratory


Result Diagrams: 


                                 10/20/20 19:58





                                 10/20/20 19:58


Laboratory results interpreted by me: 


                                        











  10/20/20 10/20/20 10/20/20





  19:58 19:58 21:00


 


Hgb  11.6 L  


 


MCV  75 L  


 


MCH  24.1 L  


 


RDW  15.0 H  


 


BUN   23 H 


 


Est GFR (MDRD) Non-Af   51 L 


 


Calcium   10.6 H 


 


Urine Protein    >=500 H


 


Urine Blood    LARGE H


 


Urine Urobilinogen    2.0 H


 


Urine Ascorbic Acid    40 H














Discharge





- Discharge


Clinical Impression: 


 Acute hemorrhagic cystitis, Diverticulosis





Constipation


Qualifiers:


 Constipation type: unspecified constipation type Qualified Code(s): K59.00 - 

Constipation, unspecified





Condition: Stable


Disposition: HOME, SELF-CARE


Additional Instructions: 


Use the Dulcolax suppositories to encourage bowel movement over the next several

days.  Patient was noted to have a urinary infection or bladder infection likely

causing her bleeding.  Take the antibiotics as prescribed.  Follow-up with 

primary care provider to ensure resolution of symptoms.  If patient develops 

abdominal pain or high fever greater than 101 return for reevaluation of 

symptoms.  Patient was noted to have diverticulosis on her CT imaging today.  

This may also be followed up through the primary care provider at this time


Prescriptions: 


Bisacodyl [Dulcolax 10 mg Supp.rect] 10 mg VT DAILYP PRN #5 supp.rect


 PRN Reason: 


Cephalexin Monohydrate [Keflex 500 mg Capsule] 500 mg PO Q6H 7 Days #28 capsule


Referrals: 


KRISTEL GIBBONS MD [Primary Care Provider] - Follow up as needed

## 2020-10-20 NOTE — ER DOCUMENT REPORT
ED Medical Screen (RME)





- General


Chief Complaint: Blood in Catheter


Stated Complaint: BLOOD IN URINE


Time Seen by Provider: 10/20/20 19:06


Primary Care Provider: 


KRISTEL GIBBONS MD [Primary Care Provider] - Follow up as needed


TRAVEL OUTSIDE OF THE [place] IN LAST 30 DAYS: No





- HPI


Notes: 





10/20/20 19:16


84 year old female to the ED with C/O blood in her urine for the past two weeks.

   Her caregiver states that the bleeding has gotten worse.   States that one 

day last week that she had some nausea and lost of appetite.   Caregiver denies 

fevers, chills.  Denies change from baseline confusion.  patient is on Eliquis. 

I performed a brief medical screening exam on the patient determined that the 

patient needs further evaluation and management by main side provider.  I have 

placed initial orders to help expedite care.





- Related Data


Allergies/Adverse Reactions: 


                                        





lisinopril Adverse Reaction (Mild, Verified 10/20/20 19:06)


   cough








Home Medications: LASIX.  LOSARTAN.  CARDIZEM.  MULTIVITAMIN.  MEGACE.  

TRAMADOL.  IRON





Past Medical History





- Social History


Chew tobacco use (# tins/day): No


Frequency of alcohol use: None


Drug Abuse: None





- Past Medical History


Cardiac Medical History: Reports: Hx Congestive Heart Failure, Hx Hypertension -

medicated


   Denies: Hx Atrial Fibrillation, Hx Coronary Artery Disease, Hx Heart Attack, 

Hx Hypercholesterolemia, Hx Peripheral Vascular Disease, Hx Heart Murmur


Pulmonary Medical History: 


   Denies: Hx Asthma, Hx Bronchitis, Hx COPD, Hx Pneumonia, Hx Tuberculosis


Neurological Medical History: Denies: Hx Cerebrovascular Accident, Hx Seizures, 

Hx Parkinson's Disease


Renal/ Medical History: Denies: Hx Peritoneal Dialysis


GI Medical History: Denies: Hx Hepatitis, Hx Hiatal Hernia, Hx Ulcer


Musculoskeltal Medical History: Reports Hx Arthritis, Denies Hx Multiple 

Sclerosis


Psychiatric Medical History: Reports: Hx Dementia


   Denies: Hx Depression


Infectious Medical History: Denies: Hx Hepatitis


Past Surgical History: Reports: Hx Tubal Ligation.  Denies: Hx Appendectomy, Hx 

Bowel Surgery, Hx  Section, Hx Cholecystectomy, Hx Coronary Artery 

Bypass Graft, Hx Gastric Bypass Surgery, Hx Herniorrhaphy, Hx Hysterectomy, Hx 

Mastectomy, Hx Open Heart Surgery, Hx Pacemaker, Hx Tonsillectomy





Physical Exam





- Vital signs


Vitals: 





                                        











Temp Pulse Resp BP Pulse Ox


 


 98.3 F   91   16   115/64   96 


 


 10/20/20 18:57  10/20/20 18:57  10/20/20 18:57  10/20/20 18:57  10/20/20 18:57














Course





- Vital Signs


Vital signs: 





                                        











Temp Pulse Resp BP Pulse Ox


 


 98.3 F   91   16   115/64   96 


 


 10/20/20 19:06  10/20/20 18:57  10/20/20 18:57  10/20/20 18:57  10/20/20 18:57














Doctor's Discharge





- Discharge


Referrals: 


KRISTEL GIBBONS MD [Primary Care Provider] - Follow up as needed

## 2020-10-20 NOTE — RADIOLOGY REPORT (SQ)
CLINICAL INDICATION: hematuria. .



TECHNIQUE: Noncontrast  spiral axial CT imaging was obtained of

the abdomen and pelvis with multiplanar reconstructions. This

exam was performed according to our departmental

dose-optimization program, which includes automated exposure

control, adjustment of the mA and/or kV according to patient size

and/or use of iterative reconstruction techniques.



COMPARISON: None.



CORRELATION: None.



FINDINGS: 

Abdomen:

The lung bases demonstrate chronic changes. Dependent

atelectasis. No consolidation.  The heart is prominent with

coronary calcification.  No evidence of pleural or pericardial

fluid.



The liver is homogeneous..  The gallbladder demonstrates

cholelithiasis without cholecystitis. No surrounding inflammatory

change.  The pancreas is of grossly normal contour on this

noncontrast examination.  The spleen is unremarkable.  The

adrenals are unremarkable.  The kidneys appear grossly normal

without evidence of urolithiasis or hydronephrosis.



There is no evidence of free air.  No free fluid.  No bulky

adenopathy.  Abdominal aorta is nonaneurysmal.



Pelvis:

The bowel is nonobstructed.  The bowel is unopacified with oral

contrast.  Pelvic contents demonstrate calcified myomata within

the uterus.  The appendix is not seen. Apparent remote

postsurgical change the bowel. Please correlate with history.

Diverticulosis without acute diverticulitis. Fecal impaction

within the rectum with distention of the rectum to approximately

9 cm..



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

Remote postsurgical change right hip. There is a transitional

vertebrae lumbosacral junction..



IMPRESSION:

Artifact from the patient's arms. Imaging is degraded by patient

motion, with resultant artifact.  The best possible images were

obtained.



Fecal impaction within the rectum. The bowel is nonobstructed.

Cholelithiasis without acute cholecystitis.



The cause of the patient's hematuria is not identified on this

examination..